# Patient Record
Sex: MALE | Race: BLACK OR AFRICAN AMERICAN | Employment: FULL TIME | URBAN - METROPOLITAN AREA
[De-identification: names, ages, dates, MRNs, and addresses within clinical notes are randomized per-mention and may not be internally consistent; named-entity substitution may affect disease eponyms.]

---

## 2017-01-05 ENCOUNTER — TELEPHONE (OUTPATIENT)
Dept: ORTHOPEDICS CLINIC | Facility: CLINIC | Age: 27
End: 2017-01-05

## 2017-01-05 NOTE — TELEPHONE ENCOUNTER
Pt. States that he continues to have pain in L ring finger and L knee area, so he wants to know if there is anything that he can take for the pain?

## 2017-01-05 NOTE — TELEPHONE ENCOUNTER
Ring pain sharp pain 7- 8 No change. Knee pain is the same also. Taking Advil or Ibuprofen. for pain. Feels like he is taking too much advill. . Has an appointment for Dr. Sayda Carballo on January 11th. .  MRI not until January 21.  Cancel appointment and luz

## 2017-01-06 RX ORDER — MELOXICAM 15 MG/1
15 TABLET ORAL DAILY
Qty: 30 TABLET | Refills: 0 | Status: SHIPPED | OUTPATIENT
Start: 2017-01-06 | End: 2017-02-05

## 2017-01-06 NOTE — TELEPHONE ENCOUNTER
S/w pt and informed him of VT message. Pharm confirmed. Cancelled pt appt on 1/11/17 and he will CB to RS.

## 2017-01-06 NOTE — TELEPHONE ENCOUNTER
Yes, wait until after the MRI to come in. Can take Meloxicam 15mg daily instead of Advil. #30, 0 refill. Also can take regular Tylenol as instructed over the counter.

## 2017-01-21 ENCOUNTER — HOSPITAL ENCOUNTER (OUTPATIENT)
Dept: MRI IMAGING | Facility: HOSPITAL | Age: 27
Discharge: HOME | End: 2017-01-21
Attending: ORTHOPAEDIC SURGERY
Payer: OTHER MISCELLANEOUS

## 2017-01-21 DIAGNOSIS — M54.50 LUMBAR SPINE PAIN: ICD-10-CM

## 2017-01-21 PROCEDURE — 73721 MRI JNT OF LWR EXTRE W/O DYE: CPT

## 2017-01-25 ENCOUNTER — TELEPHONE (OUTPATIENT)
Dept: ORTHOPEDICS CLINIC | Facility: CLINIC | Age: 27
End: 2017-01-25

## 2017-01-25 NOTE — TELEPHONE ENCOUNTER
S/w pt and informed him that he needs appt to discuss and he has appt on 2/8/17( soonest available for pt), but pt wants to know results.

## 2017-01-25 NOTE — TELEPHONE ENCOUNTER
You can let Smiley Roxanna know that the MRI results are more or less normal, come in to discuss in more detail and likely to get a cortisone injection.

## 2017-02-08 ENCOUNTER — OFFICE VISIT (OUTPATIENT)
Dept: ORTHOPEDICS CLINIC | Facility: CLINIC | Age: 27
End: 2017-02-08

## 2017-02-08 DIAGNOSIS — S63.654A SPRAIN OF METACARPOPHALANGEAL (MCP) JOINT OF RIGHT RING FINGER, INITIAL ENCOUNTER: ICD-10-CM

## 2017-02-08 DIAGNOSIS — S83.92XA SPRAIN OF LEFT KNEE, UNSPECIFIED LIGAMENT, INITIAL ENCOUNTER: Primary | ICD-10-CM

## 2017-02-08 PROCEDURE — 99214 OFFICE O/P EST MOD 30 MIN: CPT | Performed by: ORTHOPAEDIC SURGERY

## 2017-02-08 PROCEDURE — 99212 OFFICE O/P EST SF 10 MIN: CPT | Performed by: ORTHOPAEDIC SURGERY

## 2017-02-08 NOTE — PROGRESS NOTES
Patient presents today feeling a little better but still having pain in his left knee and right ring finger. I reviewed the MRI which essentially is normal.  I examined his knee and there are no obvious objective findings.   His right ring finger does have

## 2017-02-09 ENCOUNTER — TELEPHONE (OUTPATIENT)
Dept: DERMATOLOGY CLINIC | Facility: CLINIC | Age: 27
End: 2017-02-09

## 2017-02-09 DIAGNOSIS — Z51.81 MEDICATION MONITORING ENCOUNTER: ICD-10-CM

## 2017-02-09 DIAGNOSIS — L40.8 OTHER PSORIASIS: Primary | ICD-10-CM

## 2017-02-10 NOTE — TELEPHONE ENCOUNTER
Yes due for tb test--ok to order quantiferon gold blood test. Yes insurance changed 1st line. Might be able to do pa. If this is not an option Humira not outrageous to switch to.   No load just switch over to [employer]

## 2017-02-13 ENCOUNTER — TELEPHONE (OUTPATIENT)
Dept: ORTHOPEDICS CLINIC | Facility: CLINIC | Age: 27
End: 2017-02-13

## 2017-02-13 DIAGNOSIS — S63.655A SPRAIN OF METACARPOPHALANGEAL (MCP) JOINT OF LEFT RING FINGER, INITIAL ENCOUNTER: Primary | ICD-10-CM

## 2017-02-13 NOTE — TELEPHONE ENCOUNTER
All charting in office visit note right ring finger as well as OT order. Please advise on changing order to Left ring finger. Call to [facility]. Made aware Dr. Daniel Berg on vacation.

## 2017-02-13 NOTE — TELEPHONE ENCOUNTER
Kaylyn called from [facility]  called requesting a corrected order for occupational therapy. She states it should be left finger not right. Patient has an appt tomorrow. Please fax corrected order to [phone]. Thank you.

## 2017-02-14 NOTE — TELEPHONE ENCOUNTER
I did put the Humira dose in[facility] month rx written

## 2017-02-15 ENCOUNTER — TELEPHONE (OUTPATIENT)
Dept: ORTHOPEDICS CLINIC | Facility: CLINIC | Age: 27
End: 2017-02-15

## 2017-02-16 ENCOUNTER — TELEPHONE (OUTPATIENT)
Dept: DERMATOLOGY CLINIC | Facility: CLINIC | Age: 27
End: 2017-02-16

## 2017-02-17 ENCOUNTER — TELEPHONE (OUTPATIENT)
Dept: DERMATOLOGY CLINIC | Facility: CLINIC | Age: 27
End: 2017-02-17

## 2017-02-20 NOTE — TELEPHONE ENCOUNTER
KMT's pt. Pharmacy asking if he should be on a loading dose of Humira. Pt has a long hx of enbrel use, KMT switched to Humira due to price. I do not see any mention of loading dose, but could you advise on this please?

## 2017-02-24 ENCOUNTER — TELEPHONE (OUTPATIENT)
Dept: ORTHOPEDICS CLINIC | Facility: CLINIC | Age: 27
End: 2017-02-24

## 2017-02-24 DIAGNOSIS — S63.655A SPRAIN OF METACARPOPHALANGEAL (MCP) JOINT OF LEFT RING FINGER, INITIAL ENCOUNTER: Primary | ICD-10-CM

## 2017-02-24 NOTE — TELEPHONE ENCOUNTER
Dominic/[phone]   requesting order clarification for OT. Please call, thank you.  fax# [phone]

## 2017-02-24 NOTE — TELEPHONE ENCOUNTER
Sprain left ring finger  Can OT  do resistance? Can OT do strengthening? Three visits only?   Please advise Cristina Hunt

## 2017-02-27 NOTE — TELEPHONE ENCOUNTER
S/w Dominic @ [facility] and informed him of VT orders. He would like new OT order. Will fax to [phone]. Order faxed. Joon Johnson wants to know if he should have pt mor splint?

## 2017-03-15 ENCOUNTER — TELEPHONE (OUTPATIENT)
Dept: ORTHOPEDICS CLINIC | Facility: CLINIC | Age: 27
End: 2017-03-15

## 2017-03-15 NOTE — TELEPHONE ENCOUNTER
Pt. has questions about order that he received in the mail for physical therapy. Pt. Wants to confirm if he should get physical therapy.

## 2017-03-15 NOTE — TELEPHONE ENCOUNTER
Pt states he rc'd a ref from us for 8 more sessions of PT and wanted to know if VT added more PT.  I advised him that when we put in out order for him in 2/2017 it automatically gets submitted to the ref dept so they can obtain an auth from his HMO ins, but

## 2017-03-21 ENCOUNTER — OFFICE VISIT (OUTPATIENT)
Dept: FAMILY MEDICINE CLINIC | Facility: CLINIC | Age: 27
End: 2017-03-21

## 2017-03-21 VITALS
HEART RATE: 79 BPM | SYSTOLIC BLOOD PRESSURE: 116 MMHG | BODY MASS INDEX: 22.24 KG/M2 | WEIGHT: 164.19 LBS | DIASTOLIC BLOOD PRESSURE: 81 MMHG | HEIGHT: 72.01 IN | RESPIRATION RATE: 17 BRPM | TEMPERATURE: 98 F

## 2017-03-21 DIAGNOSIS — M12.562 ARTHROPATHY, TRAUMATIC, KNEE, LEFT: ICD-10-CM

## 2017-03-21 DIAGNOSIS — G89.29 CHRONIC PAIN OF LEFT KNEE: Primary | ICD-10-CM

## 2017-03-21 DIAGNOSIS — M25.562 CHRONIC PAIN OF LEFT KNEE: Primary | ICD-10-CM

## 2017-03-21 PROCEDURE — 99214 OFFICE O/P EST MOD 30 MIN: CPT | Performed by: FAMILY MEDICINE

## 2017-03-21 PROCEDURE — 99212 OFFICE O/P EST SF 10 MIN: CPT | Performed by: FAMILY MEDICINE

## 2017-03-21 RX ORDER — METHYLPREDNISOLONE 4 MG/1
TABLET ORAL
Qty: 1 KIT | Refills: 0 | Status: SHIPPED | OUTPATIENT
Start: 2017-03-21 | End: 2017-04-07 | Stop reason: ALTCHOICE

## 2017-03-21 NOTE — PATIENT INSTRUCTIONS
Needs ortho reassessment and possibly repeat plain film of left knee (maybe even MRI). Medrol prescribed.  Continue with PT.

## 2017-03-25 ENCOUNTER — HOSPITAL ENCOUNTER (OUTPATIENT)
Dept: GENERAL RADIOLOGY | Facility: HOSPITAL | Age: 27
Discharge: HOME | End: 2017-03-25
Attending: FAMILY MEDICINE
Payer: OTHER MISCELLANEOUS

## 2017-03-25 DIAGNOSIS — M12.562 ARTHROPATHY, TRAUMATIC, KNEE, LEFT: ICD-10-CM

## 2017-03-25 DIAGNOSIS — M25.562 CHRONIC PAIN OF LEFT KNEE: ICD-10-CM

## 2017-03-25 DIAGNOSIS — G89.29 CHRONIC PAIN OF LEFT KNEE: ICD-10-CM

## 2017-03-25 PROCEDURE — 73562 X-RAY EXAM OF KNEE 3: CPT

## 2017-03-28 ENCOUNTER — TELEPHONE (OUTPATIENT)
Dept: FAMILY MEDICINE CLINIC | Facility: CLINIC | Age: 27
End: 2017-03-28

## 2017-03-28 DIAGNOSIS — M25.562 LEFT KNEE PAIN, UNSPECIFIED CHRONICITY: Primary | ICD-10-CM

## 2017-03-28 NOTE — TELEPHONE ENCOUNTER
Ramakrishna is asking to have his x-ray results from Saturday   Please contact pt asap   Thank You !

## 2017-03-31 ENCOUNTER — TELEPHONE (OUTPATIENT)
Dept: ORTHOPEDICS CLINIC | Facility: CLINIC | Age: 27
End: 2017-03-31

## 2017-03-31 NOTE — TELEPHONE ENCOUNTER
Verified pt name and . Reviewed test results and recommendations with pt per doctor's instructions. Pt is concerned about continuing effusion of knee. Pt is asking why he is still having this problem. Please advise.

## 2017-04-03 NOTE — TELEPHONE ENCOUNTER
Dr. Chantell Friedman, pt states is willing to see orthopedic. Please review Orthopedic referral pended for your review. Thank you.      Spoke to pt, informed pt if still symptomatic Dr. Chantell Friedman can offer orthopedic referral. Pt states is willing to see ortho for further ventura

## 2017-04-03 NOTE — TELEPHONE ENCOUNTER
S/w pt and he states he had kneee XR from pcp on 3/25/17 and was looking for mychart results. I advised him since pcp ordered the XR he would have to release the results.    Pt wants to know what to do about knee because PT didn't help and he is still havin

## 2017-04-03 NOTE — TELEPHONE ENCOUNTER
Pt notified of VT message. Offered appt on 4/10/17 @ 10am. He states he will not be using WC for this OV and he will still keep his 4/17/17 appt for WC.

## 2017-04-03 NOTE — TELEPHONE ENCOUNTER
He would have to come in for evaluation as there is really nothing found wrong on the MRI or the xray

## 2017-04-07 ENCOUNTER — OFFICE VISIT (OUTPATIENT)
Dept: ORTHOPEDICS CLINIC | Facility: CLINIC | Age: 27
End: 2017-04-07

## 2017-04-07 DIAGNOSIS — M25.562 CHRONIC PAIN OF LEFT KNEE: Primary | ICD-10-CM

## 2017-04-07 DIAGNOSIS — G89.29 CHRONIC PAIN OF LEFT KNEE: Primary | ICD-10-CM

## 2017-04-07 PROCEDURE — 99244 OFF/OP CNSLTJ NEW/EST MOD 40: CPT | Performed by: ORTHOPAEDIC SURGERY

## 2017-04-07 PROCEDURE — G0463 HOSPITAL OUTPT CLINIC VISIT: HCPCS | Performed by: ORTHOPAEDIC SURGERY
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
## 2017-04-08 ENCOUNTER — TELEPHONE (OUTPATIENT)
Dept: FAMILY MEDICINE CLINIC | Facility: CLINIC | Age: 27
End: 2017-04-08

## 2017-04-08 NOTE — TELEPHONE ENCOUNTER
Patient has FMLA forms that need to be completed. He is inquiring if Dr needs to see him in office or if can be done without an appt since Dr Asha Anderson is aware of circumstances required for FMLA.  Note that he has 15 days from today's date (4/8/2017) to hav

## 2017-04-10 NOTE — TELEPHONE ENCOUNTER
LMTCB pt can fax LA papers to [facility]PO office [phone].  SKY can also transfer to [facility] until 730pm today if pt has any question

## 2017-04-13 ENCOUNTER — TELEPHONE (OUTPATIENT)
Dept: ORTHOPEDICS CLINIC | Facility: CLINIC | Age: 27
End: 2017-04-13

## 2017-04-14 NOTE — TELEPHONE ENCOUNTER
Fax number provided to [facility] to send request in writing for the records, the request will be forwarded to [employer] for completion once recieved.  Also lm for pt and emailed him a blank release, request in writing is needed either from the pt or the doct

## 2017-04-14 NOTE — TELEPHONE ENCOUNTER
Pt called back and stated forms are for a different reason. Has not been seen for this since 2015. Pt informed to make an appointment for a follow up. Appointment made. No further action needed.

## 2017-04-17 ENCOUNTER — OFFICE VISIT (OUTPATIENT)
Dept: FAMILY MEDICINE CLINIC | Facility: CLINIC | Age: 27
End: 2017-04-17

## 2017-04-17 ENCOUNTER — TELEPHONE (OUTPATIENT)
Dept: FAMILY MEDICINE CLINIC | Facility: CLINIC | Age: 27
End: 2017-04-17

## 2017-04-17 VITALS
RESPIRATION RATE: 16 BRPM | WEIGHT: 164 LBS | HEIGHT: 72 IN | DIASTOLIC BLOOD PRESSURE: 84 MMHG | HEART RATE: 82 BPM | SYSTOLIC BLOOD PRESSURE: 134 MMHG | TEMPERATURE: 98 F | BODY MASS INDEX: 22.21 KG/M2

## 2017-04-17 DIAGNOSIS — R53.82 CHRONIC FATIGUE SYNDROME: Primary | ICD-10-CM

## 2017-04-17 PROCEDURE — 99212 OFFICE O/P EST SF 10 MIN: CPT | Performed by: FAMILY MEDICINE

## 2017-04-17 PROCEDURE — 99213 OFFICE O/P EST LOW 20 MIN: CPT | Performed by: FAMILY MEDICINE

## 2017-04-17 NOTE — PROGRESS NOTES
HPI:    Patient ID: Aye Munoz is a 32year old male. HPI Comments: 32year old AA male here for follow up on chronic fatigue syndrome. Bouts have been consistent occurring about 4 times a month with an average of 6 days off per week.  Rest definitel Multiple trigger points as depicted. Neurological: He is alert and oriented to person, place, and time. No cranial nerve deficit, sensory deficit or motor deficit. ASSESSMENT/PLAN:   1. Chronic fatigue syndrome  FMLA needs update.  Recommend

## 2017-04-17 NOTE — TELEPHONE ENCOUNTER
[facility] paperwork was fax, then in office forward to [address] [facility] - 4/17/17 - on the attached patient.

## 2017-04-26 ENCOUNTER — TELEPHONE (OUTPATIENT)
Dept: ADMINISTRATIVE | Age: 27
End: 2017-04-26

## 2017-04-26 NOTE — TELEPHONE ENCOUNTER
Good morning Dr. Lemar Barthel and work accommodation forms pending in [address]. Patient is requesting intermittent time off, 1-6 days per month for his fatigue, do you approve?  Patient is also requesting a work accommodation of a set schedule, 8-10 hours per

## 2017-05-09 ENCOUNTER — TELEPHONE (OUTPATIENT)
Dept: ORTHOPEDICS CLINIC | Facility: CLINIC | Age: 27
End: 2017-05-09

## 2017-05-09 NOTE — TELEPHONE ENCOUNTER
Pt referred to  [facility] Dr. Subhash Schroeder. Appointment with Dr. Subhash Schroeder on June 20th. Asking is you know any physician that he Might get in sooner?     (Called and  [facility] is June 21)    Please advise

## 2017-05-09 NOTE — TELEPHONE ENCOUNTER
Call back to Shira. Given information on [facility] for pain management. Given phone number. Explained that he will need to cancel Dr. Francia Whitaker and get referral from primary for the  [facility].  Verbalizes understanding

## 2017-05-09 NOTE — TELEPHONE ENCOUNTER
Pt stts that the earliest he could get in to see Dr Kwasi Escobedo was June 20th - wants to know does [facility] have a different doctor he can see sooner - please advise - thank you.

## 2017-05-13 ENCOUNTER — OFFICE VISIT (OUTPATIENT)
Dept: FAMILY MEDICINE CLINIC | Facility: CLINIC | Age: 27
End: 2017-05-13

## 2017-05-13 VITALS
HEIGHT: 72 IN | HEART RATE: 80 BPM | BODY MASS INDEX: 22.21 KG/M2 | SYSTOLIC BLOOD PRESSURE: 130 MMHG | WEIGHT: 164 LBS | DIASTOLIC BLOOD PRESSURE: 80 MMHG | RESPIRATION RATE: 16 BRPM | TEMPERATURE: 98 F

## 2017-05-13 DIAGNOSIS — R53.82 CHRONIC FATIGUE SYNDROME: Primary | ICD-10-CM

## 2017-05-13 PROCEDURE — 99212 OFFICE O/P EST SF 10 MIN: CPT | Performed by: FAMILY MEDICINE

## 2017-05-13 PROCEDURE — 99213 OFFICE O/P EST LOW 20 MIN: CPT | Performed by: FAMILY MEDICINE

## 2017-05-13 NOTE — PROGRESS NOTES
HPI:    Patient ID: Dio Tellez is a 32year old male. Malaise  This is a chronic problem. The current episode started more than 1 year ago. The problem occurs intermittently. The problem has been waxing and waning.  Associated symptoms include arthra

## 2017-05-19 ENCOUNTER — HOSPITAL ENCOUNTER (OUTPATIENT)
Dept: GENERAL RADIOLOGY | Facility: HOSPITAL | Age: 27
Discharge: HOME | End: 2017-05-19
Attending: ORTHOPAEDIC SURGERY
Payer: COMMERCIAL

## 2017-05-19 ENCOUNTER — OFFICE VISIT (OUTPATIENT)
Dept: ORTHOPEDICS CLINIC | Facility: CLINIC | Age: 27
End: 2017-05-19

## 2017-05-19 DIAGNOSIS — R52 PAIN: ICD-10-CM

## 2017-05-19 DIAGNOSIS — M22.41 PATELLA, CHONDROMALACIA, RIGHT: Primary | ICD-10-CM

## 2017-05-19 PROCEDURE — 99212 OFFICE O/P EST SF 10 MIN: CPT | Performed by: ORTHOPAEDIC SURGERY

## 2017-05-19 PROCEDURE — 73560 X-RAY EXAM OF KNEE 1 OR 2: CPT | Performed by: ORTHOPAEDIC SURGERY

## 2017-05-19 PROCEDURE — 73565 X-RAY EXAM OF KNEES: CPT | Performed by: ORTHOPAEDIC SURGERY

## 2017-05-19 PROCEDURE — 99243 OFF/OP CNSLTJ NEW/EST LOW 30: CPT | Performed by: ORTHOPAEDIC SURGERY

## 2017-05-19 NOTE — PROGRESS NOTES
5/19/2017  Jayson Boycemorris  112/1990  32year old   male  Alona Cockayne, MD    HPI:   Patient presents with:  Knee Pain: Pt is her efor a follow up on left knee pain. Pain continues anbnd now he is having pain in the right knee.  Appointment for Dr. Germaine Avila patient is awake and oriented x 3 and overall well appearing. The patient has normal mood. The patient is non-tender and atraumatic with the exception of their right knee. The patient's skin is intact and compartments are soft.   The patient's lower ex

## 2017-05-22 ENCOUNTER — TELEPHONE (OUTPATIENT)
Dept: FAMILY MEDICINE CLINIC | Facility: CLINIC | Age: 27
End: 2017-05-22

## 2017-05-22 DIAGNOSIS — L40.9 PSORIASIS: Primary | ICD-10-CM

## 2017-05-23 ENCOUNTER — PATIENT MESSAGE (OUTPATIENT)
Dept: FAMILY MEDICINE CLINIC | Facility: CLINIC | Age: 27
End: 2017-05-23

## 2017-05-24 ENCOUNTER — PATIENT MESSAGE (OUTPATIENT)
Dept: FAMILY MEDICINE CLINIC | Facility: CLINIC | Age: 27
End: 2017-05-24

## 2017-05-24 DIAGNOSIS — M25.562 PAIN IN BOTH KNEES, UNSPECIFIED CHRONICITY: Primary | ICD-10-CM

## 2017-05-24 DIAGNOSIS — M25.561 PAIN IN BOTH KNEES, UNSPECIFIED CHRONICITY: Primary | ICD-10-CM

## 2017-05-27 NOTE — TELEPHONE ENCOUNTER
DR Luis Miguel Tipton: please advise on Rheum referral request.    [facility] referral already approved in system.

## 2017-05-27 NOTE — TELEPHONE ENCOUNTER
From: Vickie Bryant  To: Ghislaine Ngo DO  Sent: 5/24/2017 3:28 AM CDT  Subject: Other    Requesting a referral to see a Rheumatologist for joint knee pain in the left and now right leg.      As well as a referral to see Dr. Mariana Hernandez to talk about pso

## 2017-06-07 ENCOUNTER — LAB ENCOUNTER (OUTPATIENT)
Dept: LAB | Age: 27
End: 2017-06-07
Attending: DERMATOLOGY
Payer: COMMERCIAL

## 2017-06-07 ENCOUNTER — OFFICE VISIT (OUTPATIENT)
Dept: DERMATOLOGY CLINIC | Facility: CLINIC | Age: 27
End: 2017-06-07

## 2017-06-07 DIAGNOSIS — Z79.899 ENCOUNTER FOR MEDICATION MANAGEMENT: ICD-10-CM

## 2017-06-07 DIAGNOSIS — L40.50 PSORIATIC ARTHRITIS (HCC): ICD-10-CM

## 2017-06-07 DIAGNOSIS — L40.0 PSORIASIS VULGARIS: Primary | ICD-10-CM

## 2017-06-07 DIAGNOSIS — L40.0 PSORIASIS VULGARIS: ICD-10-CM

## 2017-06-07 PROCEDURE — 86038 ANTINUCLEAR ANTIBODIES: CPT

## 2017-06-07 PROCEDURE — 85025 COMPLETE CBC W/AUTO DIFF WBC: CPT

## 2017-06-07 PROCEDURE — 36415 COLL VENOUS BLD VENIPUNCTURE: CPT

## 2017-06-07 PROCEDURE — 86431 RHEUMATOID FACTOR QUANT: CPT

## 2017-06-07 PROCEDURE — 99212 OFFICE O/P EST SF 10 MIN: CPT | Performed by: DERMATOLOGY

## 2017-06-07 PROCEDURE — 80053 COMPREHEN METABOLIC PANEL: CPT

## 2017-06-07 PROCEDURE — 99213 OFFICE O/P EST LOW 20 MIN: CPT | Performed by: DERMATOLOGY

## 2017-06-07 PROCEDURE — 86039 ANTINUCLEAR ANTIBODIES (ANA): CPT

## 2017-06-07 PROCEDURE — 86480 TB TEST CELL IMMUN MEASURE: CPT

## 2017-06-07 PROCEDURE — 86141 C-REACTIVE PROTEIN HS: CPT

## 2017-06-08 ENCOUNTER — TELEPHONE (OUTPATIENT)
Dept: ORTHOPEDICS CLINIC | Facility: CLINIC | Age: 27
End: 2017-06-08

## 2017-06-08 NOTE — PROGRESS NOTES
Quick Note:    Results not released. Not all final at this time. So far .  Vernon Samaniego pending  ______

## 2017-06-26 NOTE — PROGRESS NOTES
Rosario Martin is a 32year old male. Patient presents with:  Psoriasis: Pt here for annual visit. Needs RF of Humira. Pt states weekly dosing offered better control, now twice a month. Pt has flare at scalp (itchy) and groin and buttocks.     Derm anesthetic No     Social History Narrative   None on file     Family History   Problem Relation Age of Onset   • Cancer Mother    • Hypertension Mother    • Asthma Mother    • Breast Cancer Mother    • Prostate Cancer Paternal Grandfather    • Asthma Cousi Certainly could have overlap. Given flaring try increased dose of Humira. IpledgeWorsening symptoms we will increase the Humira to weekly. Will treat with medications in  grid. Overall skincare, liberal use of emollients discussed.       TB test:  Adrian

## 2017-07-11 ENCOUNTER — TELEPHONE (OUTPATIENT)
Dept: FAMILY MEDICINE CLINIC | Facility: CLINIC | Age: 27
End: 2017-07-11

## 2017-07-11 NOTE — TELEPHONE ENCOUNTER
Per Pt he has an appt tomorrow @ 9:20 AM with  and has FMLA papers needs to filled out,  Pt needs to know if Dr needs to see him or if he just need to go and bring those papers to the [facility] department? Pls advise.

## 2017-07-19 ENCOUNTER — OFFICE VISIT (OUTPATIENT)
Dept: FAMILY MEDICINE CLINIC | Facility: CLINIC | Age: 27
End: 2017-07-19

## 2017-07-19 VITALS
SYSTOLIC BLOOD PRESSURE: 131 MMHG | BODY MASS INDEX: 23.24 KG/M2 | TEMPERATURE: 98 F | HEART RATE: 79 BPM | WEIGHT: 166 LBS | HEIGHT: 71 IN | DIASTOLIC BLOOD PRESSURE: 70 MMHG

## 2017-07-19 DIAGNOSIS — S89.92XD LEFT KNEE INJURY, SUBSEQUENT ENCOUNTER: Primary | ICD-10-CM

## 2017-07-19 DIAGNOSIS — S69.90XD FINGER INJURY, UNSPECIFIED LATERALITY, SUBSEQUENT ENCOUNTER: ICD-10-CM

## 2017-07-19 DIAGNOSIS — S39.92XD LOWER BACK INJURY, SUBSEQUENT ENCOUNTER: ICD-10-CM

## 2017-07-19 PROCEDURE — 99214 OFFICE O/P EST MOD 30 MIN: CPT | Performed by: FAMILY MEDICINE

## 2017-07-19 PROCEDURE — 99212 OFFICE O/P EST SF 10 MIN: CPT | Performed by: FAMILY MEDICINE
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
## 2017-07-25 ENCOUNTER — TELEPHONE (OUTPATIENT)
Dept: DERMATOLOGY CLINIC | Facility: CLINIC | Age: 27
End: 2017-07-25

## 2017-07-25 NOTE — TELEPHONE ENCOUNTER
Work comp requests are completed by our copy service [employer]. Please forward patients or Ins Co to them - ph. [phone].  NK

## 2017-07-25 NOTE — TELEPHONE ENCOUNTER
LOV 6/7/17. Pt c/o pink, flaking patch to bottom of right foot. Requesting a topical treatment. Pt on Humira (due for injection today).

## 2017-07-26 RX ORDER — CLOBETASOL PROPIONATE 0.5 MG/G
1 CREAM TOPICAL 2 TIMES DAILY
Qty: 45 G | Refills: 1 | Status: SHIPPED | OUTPATIENT
Start: 2017-07-26 | End: 2017-07-27

## 2017-07-27 ENCOUNTER — TELEPHONE (OUTPATIENT)
Dept: DERMATOLOGY CLINIC | Facility: CLINIC | Age: 27
End: 2017-07-27

## 2017-07-27 RX ORDER — CLOBETASOL PROPIONATE 0.5 MG/G
1 CREAM TOPICAL 2 TIMES DAILY
Qty: 45 G | Refills: 1 | Status: SHIPPED | OUTPATIENT
Start: 2017-07-27 | End: 2017-08-28 | Stop reason: ALTCHOICE

## 2017-07-27 NOTE — TELEPHONE ENCOUNTER
Clinical program request  Reference [id number] pls see paper work in nurses box [facility], action required and pls reply promptly

## 2017-07-27 NOTE — TELEPHONE ENCOUNTER
[facility] called the office and asked that Clobetasol Rx should be cancelled per Damaso Ordoñez, informed yes.

## 2017-08-28 ENCOUNTER — OFFICE VISIT (OUTPATIENT)
Dept: FAMILY MEDICINE CLINIC | Facility: CLINIC | Age: 27
End: 2017-08-28

## 2017-08-28 VITALS
WEIGHT: 166 LBS | HEIGHT: 71 IN | SYSTOLIC BLOOD PRESSURE: 116 MMHG | BODY MASS INDEX: 23.24 KG/M2 | HEART RATE: 78 BPM | DIASTOLIC BLOOD PRESSURE: 76 MMHG | TEMPERATURE: 98 F | RESPIRATION RATE: 16 BRPM

## 2017-08-28 DIAGNOSIS — M25.562 CHRONIC PAIN OF BOTH KNEES: Primary | ICD-10-CM

## 2017-08-28 DIAGNOSIS — G89.29 CHRONIC PAIN OF BOTH KNEES: Primary | ICD-10-CM

## 2017-08-28 DIAGNOSIS — L40.9 PSORIASIS: ICD-10-CM

## 2017-08-28 DIAGNOSIS — M25.561 CHRONIC PAIN OF BOTH KNEES: Primary | ICD-10-CM

## 2017-08-28 DIAGNOSIS — M12.562 TRAUMATIC ARTHROPATHY OF LEFT KNEE: ICD-10-CM

## 2017-08-28 PROCEDURE — 99212 OFFICE O/P EST SF 10 MIN: CPT | Performed by: FAMILY MEDICINE

## 2017-08-28 PROCEDURE — 99214 OFFICE O/P EST MOD 30 MIN: CPT | Performed by: FAMILY MEDICINE
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
## 2017-09-01 ENCOUNTER — OFFICE VISIT (OUTPATIENT)
Dept: FAMILY MEDICINE CLINIC | Facility: CLINIC | Age: 27
End: 2017-09-01

## 2017-09-01 ENCOUNTER — NURSE TRIAGE (OUTPATIENT)
Dept: OTHER | Age: 27
End: 2017-09-01

## 2017-09-01 VITALS
WEIGHT: 165 LBS | TEMPERATURE: 98 F | HEART RATE: 56 BPM | SYSTOLIC BLOOD PRESSURE: 119 MMHG | DIASTOLIC BLOOD PRESSURE: 76 MMHG | BODY MASS INDEX: 23 KG/M2

## 2017-09-01 DIAGNOSIS — L02.01 CUTANEOUS ABSCESS OF FACE: Primary | ICD-10-CM

## 2017-09-01 PROCEDURE — 99212 OFFICE O/P EST SF 10 MIN: CPT | Performed by: FAMILY MEDICINE

## 2017-09-01 PROCEDURE — 99213 OFFICE O/P EST LOW 20 MIN: CPT | Performed by: FAMILY MEDICINE

## 2017-09-01 RX ORDER — AMOXICILLIN AND CLAVULANATE POTASSIUM 875; 125 MG/1; MG/1
1 TABLET, FILM COATED ORAL 2 TIMES DAILY
Qty: 20 TABLET | Refills: 0 | Status: SHIPPED | OUTPATIENT
Start: 2017-09-01 | End: 2017-09-05 | Stop reason: ALTCHOICE

## 2017-09-01 NOTE — PROGRESS NOTES
HPI:    Patient ID: Yolanda Pompa is a 32year old male. HPI  Patient presents with:  Swelling: left eye swelling  Started this morning and noticed large acne lesion on forehead last night. Review of Systems   Constitutional: Negative.     Eyes: [place]stock Redo

## 2017-09-02 ENCOUNTER — HOSPITAL ENCOUNTER (OUTPATIENT)
Age: 27
Discharge: HOME | End: 2017-09-02
Attending: EMERGENCY MEDICINE
Payer: COMMERCIAL

## 2017-09-02 ENCOUNTER — TELEPHONE (OUTPATIENT)
Dept: OTHER | Age: 27
End: 2017-09-02

## 2017-09-02 VITALS
WEIGHT: 166 LBS | OXYGEN SATURATION: 100 % | TEMPERATURE: 98 F | DIASTOLIC BLOOD PRESSURE: 77 MMHG | SYSTOLIC BLOOD PRESSURE: 121 MMHG | RESPIRATION RATE: 16 BRPM | BODY MASS INDEX: 22.48 KG/M2 | HEART RATE: 75 BPM | HEIGHT: 72 IN

## 2017-09-02 DIAGNOSIS — L03.211 FACIAL CELLULITIS: ICD-10-CM

## 2017-09-02 DIAGNOSIS — L02.01 FACIAL ABSCESS: Primary | ICD-10-CM

## 2017-09-02 PROCEDURE — 99214 OFFICE O/P EST MOD 30 MIN: CPT

## 2017-09-02 PROCEDURE — 87205 SMEAR GRAM STAIN: CPT | Performed by: EMERGENCY MEDICINE

## 2017-09-02 PROCEDURE — 10060 I&D ABSCESS SIMPLE/SINGLE: CPT

## 2017-09-02 PROCEDURE — 99203 OFFICE O/P NEW LOW 30 MIN: CPT

## 2017-09-02 PROCEDURE — 87070 CULTURE OTHR SPECIMN AEROBIC: CPT | Performed by: EMERGENCY MEDICINE

## 2017-09-02 RX ORDER — DOXYCYCLINE HYCLATE 100 MG/1
100 CAPSULE ORAL 2 TIMES DAILY
Qty: 20 CAPSULE | Refills: 0 | Status: SHIPPED | OUTPATIENT
Start: 2017-09-02 | End: 2017-09-27

## 2017-09-02 NOTE — TELEPHONE ENCOUNTER
Pt stts saw Dr. Basil Hill for abscess on forehead and left eyelid swelling yesterday. Pt feels today  it is getting worse. No visual problem. I advised pt to go to urgent care to have it evaluated if insurance allows. Pt agreed.

## 2017-09-02 NOTE — ED INITIAL ASSESSMENT (HPI)
PATIENT ARRIVED AMBULATORY TO ROOM. POSSIBLE ABSCESS ABOVE THE LEFT EYEBROW.  PATIENT STATES \"I NOTICED IT 2 DAYS AGO\" PATIENT STATES \"I WENT TO THE DOCTOR YESTERDAY AND  HE TOLD ME IT WAS AN ABSCESS AND PUT ME ON ANTIBIOTICS\" PATIENT STATES \"THIS MORN

## 2017-09-02 NOTE — ED PROVIDER NOTES
Patient Seen in: [address]    History   Patient presents with:  Eye Problem    Stated Complaint: swelling around left eye    HPI    Patient's a 26-year-old male with a history of psoriasis who is currently on Humira note social      Review of Systems    Positive for stated complaint: swelling around left eye  Other systems are as noted in HPI. Constitutional and vital signs reviewed. All other systems reviewed and negative except as noted above.     PSFH elements revi 3 days  For Recheck      Medications Prescribed:  Current Discharge Medication List    START taking these medications    Doxycycline Hyclate 100 MG Oral Cap  Take 1 capsule (100 mg total) by mouth 2 (two) times daily.   Qty: 20 capsule Refills: 0

## 2017-09-03 ENCOUNTER — HOSPITAL ENCOUNTER (OUTPATIENT)
Age: 27
Discharge: HOME | End: 2017-09-03
Attending: EMERGENCY MEDICINE
Payer: COMMERCIAL

## 2017-09-03 VITALS
SYSTOLIC BLOOD PRESSURE: 117 MMHG | DIASTOLIC BLOOD PRESSURE: 74 MMHG | TEMPERATURE: 98 F | BODY MASS INDEX: 22.48 KG/M2 | HEART RATE: 79 BPM | HEIGHT: 72 IN | OXYGEN SATURATION: 97 % | RESPIRATION RATE: 16 BRPM | WEIGHT: 166 LBS

## 2017-09-03 DIAGNOSIS — L03.211 FACIAL CELLULITIS: Primary | ICD-10-CM

## 2017-09-03 DIAGNOSIS — L02.01 FACIAL ABSCESS: ICD-10-CM

## 2017-09-03 PROCEDURE — 99211 OFF/OP EST MAY X REQ PHY/QHP: CPT

## 2017-09-03 NOTE — ED INITIAL ASSESSMENT (HPI)
Pt was seen at [address] [place] [address] yesterday for swelling around  left eye and bump to face. Pt here per MD to follow up. Swelling appears to be better.  Pt denies any new complaints

## 2017-09-03 NOTE — ED PROVIDER NOTES
Patient Seen in: [facility]    History   Patient presents with:   Other    Stated Complaint: follow up w doc    HPI    The patient is a 27-year-old male with a history of psoriasis and is on Humira with the last injection Grandfather    • Asthma Cousin    • Asthma Paternal Uncle        Smoking status: Current Every Day Smoker                                                   Packs/day: 0.25      Years: 9.00         Types: Cigarettes  Smokeless tobacco: Never Used in 3 days  For Recheck      Medications Prescribed:  Current Discharge Medication List

## 2017-09-05 ENCOUNTER — OFFICE VISIT (OUTPATIENT)
Dept: FAMILY MEDICINE CLINIC | Facility: CLINIC | Age: 27
End: 2017-09-05

## 2017-09-05 VITALS
SYSTOLIC BLOOD PRESSURE: 114 MMHG | HEIGHT: 72 IN | WEIGHT: 163 LBS | BODY MASS INDEX: 22.08 KG/M2 | HEART RATE: 73 BPM | DIASTOLIC BLOOD PRESSURE: 76 MMHG

## 2017-09-05 DIAGNOSIS — L02.01 ABSCESS OF FACE: Primary | ICD-10-CM

## 2017-09-05 PROCEDURE — 99212 OFFICE O/P EST SF 10 MIN: CPT | Performed by: PHYSICIAN ASSISTANT

## 2017-09-05 NOTE — PROGRESS NOTES
HPI:    Patient ID: Mason Alonzo is a 32year old male. Patient presents for follow up from immediate care for abscess to left side forehead since 9/1/17. Patient was initially seen and prescribed Augmentin.   He then noted area became more swollen an placed in this encounter.       Meds This Visit:  No prescriptions requested or ordered in this encounter    Imaging & Referrals:  None       #[id number]

## 2017-09-27 ENCOUNTER — OFFICE VISIT (OUTPATIENT)
Dept: DERMATOLOGY CLINIC | Facility: CLINIC | Age: 27
End: 2017-09-27

## 2017-09-27 ENCOUNTER — TELEPHONE (OUTPATIENT)
Dept: DERMATOLOGY CLINIC | Facility: CLINIC | Age: 27
End: 2017-09-27

## 2017-09-27 DIAGNOSIS — L40.50 PSORIATIC ARTHRITIS (HCC): ICD-10-CM

## 2017-09-27 DIAGNOSIS — Z51.81 MEDICATION MONITORING ENCOUNTER: ICD-10-CM

## 2017-09-27 DIAGNOSIS — L40.9 PSORIASIS: ICD-10-CM

## 2017-09-27 DIAGNOSIS — L40.8 OTHER PSORIASIS: Primary | ICD-10-CM

## 2017-09-27 DIAGNOSIS — Z51.81 ENCOUNTER FOR MEDICATION MONITORING: ICD-10-CM

## 2017-09-27 PROCEDURE — 99214 OFFICE O/P EST MOD 30 MIN: CPT | Performed by: DERMATOLOGY

## 2017-09-27 PROCEDURE — 99212 OFFICE O/P EST SF 10 MIN: CPT | Performed by: DERMATOLOGY

## 2017-09-27 RX ORDER — CALCIPOTRIENE 50 UG/G
CREAM TOPICAL
Qty: 120 G | Refills: 12 | Status: SHIPPED | OUTPATIENT
Start: 2017-09-27 | End: 2017-09-27

## 2017-09-27 RX ORDER — CALCIPOTRIENE 50 UG/G
CREAM TOPICAL
Qty: 120 G | Refills: 12 | Status: SHIPPED | OUTPATIENT
Start: 2017-09-27 | End: 2018-02-19 | Stop reason: ALTCHOICE

## 2017-09-27 NOTE — TELEPHONE ENCOUNTER
Pt want want his regulary [facility]  Please resent med. n humira only med go to mail in [place]

## 2017-09-27 NOTE — TELEPHONE ENCOUNTER
Pt requested to have methotrexate and calcipotriene transferred to [address] [place] [address] cancelled at mail order and sent to retail instead

## 2017-09-30 ENCOUNTER — PATIENT MESSAGE (OUTPATIENT)
Dept: FAMILY MEDICINE CLINIC | Facility: CLINIC | Age: 27
End: 2017-09-30

## 2017-09-30 DIAGNOSIS — L40.50 PA (PSORIATIC ARTHRITIS) (HCC): Primary | ICD-10-CM

## 2017-09-30 NOTE — TELEPHONE ENCOUNTER
From: Robert Trent  To: Isra Marshall DO  Sent: 9/30/2017 5:56 AM CDT  Subject: Referral Request    Good morning,     doctor Laquita Paredes will like me to request referral to see a rheumatologist for PsA.     As well, are you able to provide me a doctor's

## 2017-09-30 NOTE — TELEPHONE ENCOUNTER
DR Cesar Jo: see patient nomi. Was seen by DR Wen Hicks 9/27/17; requesting referral to [facility].   Also see LA request.

## 2017-10-03 ENCOUNTER — TELEPHONE (OUTPATIENT)
Dept: DERMATOLOGY CLINIC | Facility: CLINIC | Age: 27
End: 2017-10-03

## 2017-10-03 DIAGNOSIS — Z51.81 ENCOUNTER FOR MEDICATION MONITORING: ICD-10-CM

## 2017-10-03 DIAGNOSIS — L40.0 PSORIASIS VULGARIS: Primary | ICD-10-CM

## 2017-10-03 RX ORDER — FOLIC ACID 1 MG/1
1 TABLET ORAL DAILY
Qty: 30 TABLET | Refills: 12 | Status: SHIPPED
Start: 2017-10-03 | End: 2018-02-19 | Stop reason: ALTCHOICE

## 2017-10-03 RX ORDER — FOLIC ACID 1 MG/1
1 TABLET ORAL DAILY
Qty: 30 TABLET | Refills: 12 | Status: SHIPPED | OUTPATIENT
Start: 2017-10-03 | End: 2017-10-03

## 2017-10-03 NOTE — TELEPHONE ENCOUNTER
Yes, discussed with patient at visit. New Rx sent. (Well aware of this since my best friend is the Paulina Parks of the article that prompted this therapeutic change) may need to use a reduced dose of folic acid if GI upset. Low-dose methotrexate anticipated.

## 2017-10-03 NOTE — TELEPHONE ENCOUNTER
Pt informed of message below. [facility] [place] [facility] request[facility]     Also, [facility] because pt states pharmacy is requesting order with \"weekly\" frequency. FYI.

## 2017-10-03 NOTE — TELEPHONE ENCOUNTER
Fax received from [facility] suggesting that pt should be on folic acid due to methotrexate use. Please advise on this.

## 2017-10-08 NOTE — PROGRESS NOTES
Alicia Henriquez is a 32year old male. Patient presents with:  Psoriasis: F/up - additional sites include chin, feet, hands. Humira once per week.   Knee Pain: Bilateral knee pain x 10 months; possible psoriatic arthritis, per patient            Season External Cream Use bid Disp: 120 g Rfl: 12     Allergies:     Seasonal                    Past Medical History:   Diagnosis Date   • Arthritis    • Chronic fatigue syndrome    • Metal bone fixation hardware in place 2008    plate in jaw   • Psoriasis    • con't :   Patient with chronic psoriasis. On systemic agents noted. No problems tolerating. No adverse events,  no problems with infection. Denies any significant joint swelling pain tenderness stiffness. No fevers chills night sweats.   No lymph node Possible retinoid although patient concerned regarding dryness, long-term side effects.   ASSESSMENT AND PLAN:     Other psoriasis  (primary encounter diagnosis)  Encounter for medication monitoring  Psoriasis  Medication monitoring encounter  Psoriatic art

## 2017-10-20 ENCOUNTER — LAB ENCOUNTER (OUTPATIENT)
Dept: LAB | Age: 27
End: 2017-10-20
Attending: DERMATOLOGY
Payer: COMMERCIAL

## 2017-10-20 DIAGNOSIS — L40.0 PSORIASIS VULGARIS: ICD-10-CM

## 2017-10-20 DIAGNOSIS — Z51.81 ENCOUNTER FOR MEDICATION MONITORING: ICD-10-CM

## 2017-10-20 PROCEDURE — 36415 COLL VENOUS BLD VENIPUNCTURE: CPT

## 2017-10-20 PROCEDURE — 85025 COMPLETE CBC W/AUTO DIFF WBC: CPT

## 2017-10-21 NOTE — PROGRESS NOTES
Pt notified of lab results and confirmed Oracio Parnell wants CBC repeated late next week. Pt voiced understanding & notes he is taking methotrexate Sunday, Monday & Tues. Discussed with Oracio Parnell and he was advised to take 3 tabs on 1 day per week. Pt agreed to this.

## 2017-11-08 ENCOUNTER — OFFICE VISIT (OUTPATIENT)
Dept: RHEUMATOLOGY | Facility: CLINIC | Age: 27
End: 2017-11-08

## 2017-11-08 VITALS
HEIGHT: 72 IN | HEART RATE: 70 BPM | DIASTOLIC BLOOD PRESSURE: 65 MMHG | BODY MASS INDEX: 21.65 KG/M2 | WEIGHT: 159.81 LBS | SYSTOLIC BLOOD PRESSURE: 114 MMHG

## 2017-11-08 DIAGNOSIS — L40.9 PSORIASIS: Primary | ICD-10-CM

## 2017-11-08 DIAGNOSIS — Z51.81 MEDICATION MONITORING ENCOUNTER: ICD-10-CM

## 2017-11-08 DIAGNOSIS — L40.50 PSORIATIC ARTHROPATHY (HCC): ICD-10-CM

## 2017-11-08 PROCEDURE — 99212 OFFICE O/P EST SF 10 MIN: CPT | Performed by: INTERNAL MEDICINE

## 2017-11-08 PROCEDURE — 99244 OFF/OP CNSLTJ NEW/EST MOD 40: CPT | Performed by: INTERNAL MEDICINE

## 2017-11-08 NOTE — PROGRESS NOTES
Dear Siva Anderson:    I saw your patient Olivier Del Real in consultation this afternoon at your request for evaluation of psoriatic arthritis.     As you know, he is a 19-year-old gentleman who developed severe psoriasis in 2008, mostly on his scalp, genitals, an  on his feet aggravates his knee pain,and can make his left knee swell. Past medical history:  He has chronic fatigue syndrome. He has chronic tension headaches. He fractured his left lower jaw and has a plate.   He stopped smoking normal.  There is no synovitis across his PIP joints. Capillary refill is good in his fingertips. Hips move well. There is a very small effusion in his left knee. Both knees flex and extend fully. No instability. No joint line tenderness.   Ankles mov

## 2017-11-17 ENCOUNTER — OFFICE VISIT (OUTPATIENT)
Dept: FAMILY MEDICINE CLINIC | Facility: CLINIC | Age: 27
End: 2017-11-17

## 2017-11-17 ENCOUNTER — LAB ENCOUNTER (OUTPATIENT)
Dept: LAB | Age: 27
End: 2017-11-17
Attending: INTERNAL MEDICINE
Payer: COMMERCIAL

## 2017-11-17 VITALS
HEIGHT: 72 IN | HEART RATE: 66 BPM | BODY MASS INDEX: 21.54 KG/M2 | TEMPERATURE: 97 F | RESPIRATION RATE: 17 BRPM | SYSTOLIC BLOOD PRESSURE: 136 MMHG | DIASTOLIC BLOOD PRESSURE: 85 MMHG | WEIGHT: 159 LBS

## 2017-11-17 DIAGNOSIS — L40.0 PSORIASIS VULGARIS: ICD-10-CM

## 2017-11-17 DIAGNOSIS — L40.9 PSORIASIS: Primary | ICD-10-CM

## 2017-11-17 DIAGNOSIS — B35.3 TINEA PEDIS OF BOTH FEET: ICD-10-CM

## 2017-11-17 DIAGNOSIS — Z23 NEED FOR PROPHYLACTIC VACCINATION AND INOCULATION AGAINST INFLUENZA: ICD-10-CM

## 2017-11-17 DIAGNOSIS — L40.50 PSORIATIC ARTHROPATHY (HCC): ICD-10-CM

## 2017-11-17 DIAGNOSIS — R79.89 ABNORMAL CBC: ICD-10-CM

## 2017-11-17 DIAGNOSIS — Z51.81 MEDICATION MONITORING ENCOUNTER: ICD-10-CM

## 2017-11-17 DIAGNOSIS — Z51.81 ENCOUNTER FOR MEDICATION MONITORING: ICD-10-CM

## 2017-11-17 PROCEDURE — 85652 RBC SED RATE AUTOMATED: CPT

## 2017-11-17 PROCEDURE — 99212 OFFICE O/P EST SF 10 MIN: CPT | Performed by: PHYSICIAN ASSISTANT

## 2017-11-17 PROCEDURE — 36415 COLL VENOUS BLD VENIPUNCTURE: CPT

## 2017-11-17 PROCEDURE — 90471 IMMUNIZATION ADMIN: CPT | Performed by: PHYSICIAN ASSISTANT

## 2017-11-17 PROCEDURE — 84450 TRANSFERASE (AST) (SGOT): CPT

## 2017-11-17 PROCEDURE — 82565 ASSAY OF CREATININE: CPT

## 2017-11-17 PROCEDURE — 99213 OFFICE O/P EST LOW 20 MIN: CPT | Performed by: PHYSICIAN ASSISTANT

## 2017-11-17 PROCEDURE — 90686 IIV4 VACC NO PRSV 0.5 ML IM: CPT | Performed by: PHYSICIAN ASSISTANT

## 2017-11-17 PROCEDURE — 86140 C-REACTIVE PROTEIN: CPT

## 2017-11-17 PROCEDURE — 85025 COMPLETE CBC W/AUTO DIFF WBC: CPT

## 2017-11-17 PROCEDURE — 82040 ASSAY OF SERUM ALBUMIN: CPT

## 2017-11-17 RX ORDER — TRIAMCINOLONE ACETONIDE 5 MG/G
CREAM TOPICAL
Qty: 30 G | Refills: 1 | Status: SHIPPED | OUTPATIENT
Start: 2017-11-17 | End: 2018-02-19 | Stop reason: ALTCHOICE

## 2017-11-17 RX ORDER — KETOCONAZOLE 20 MG/G
CREAM TOPICAL
Qty: 30 G | Refills: 1 | Status: SHIPPED | OUTPATIENT
Start: 2017-11-17 | End: 2018-01-03

## 2017-11-17 NOTE — PROGRESS NOTES
HPI:    Patient ID: Marge Pineda is a 32year old male. Patient presents for burning and itchy rash on both feet for past two months. He has history of psoriasis and psoriatic arthritis and is on Humira. He had psoriasis on scalp which has resolved. Bilateral medial aspect of feet erythema with peeling skin, one area of open skin without any signs of secondary infection. Psychiatric: He has a normal mood and affect. Vitals reviewed.              ASSESSMENT/PLAN:   Psoriasis  (primary encounter diag

## 2017-11-20 ENCOUNTER — TELEPHONE (OUTPATIENT)
Dept: OTHER | Age: 27
End: 2017-11-20

## 2017-11-20 NOTE — TELEPHONE ENCOUNTER
----- Message from Olivier Del Real sent at 11/19/2017  5:39 AM CST -----  Regarding: Other  Contact: [phone]  Good morning,    I provided my job with the note excusing me for the dates of 11/14 and 11/17.       But they are stating in order to be excuse

## 2017-12-05 ENCOUNTER — TELEPHONE (OUTPATIENT)
Dept: ADMINISTRATIVE | Age: 27
End: 2017-12-05

## 2017-12-05 NOTE — TELEPHONE ENCOUNTER
FMLA form received from pt via email for [place], [place]. PAU packet emailed to pt ([email or web address]). Logged for processing.  NK

## 2017-12-05 NOTE — TELEPHONE ENCOUNTER
Sanket Escalante pending in PAU. Pt has 1-6 days intermittent time off with Dr. Isidoro Gottron for CFS, but now pt is requesting 1-8 days intermittent time off starting from 11/1/17 - 3/30/18. Do you approve? Please advise.     Thank you,  Francisco Vargas

## 2017-12-06 NOTE — TELEPHONE ENCOUNTER
Completed. Signature stamp is on page 4 of document,  Please let me know how to move it to another page or re-do to move stamp to provider signature line if needed. Thanks.

## 2017-12-06 NOTE — TELEPHONE ENCOUNTER
Dr. Kunal Walker,    Please sign off on form:  -Highlight the patient and hit \"Chart\" button. -In Chart Review, w/in the Encounter tab - open the Telephone call encounter for 12/5/17. Scroll down.  -Click \"scan on\" blue Hyperlink under \"Media\" heading for MARTÍN Reed [birth year].  -Click on Acknowledge button at the bottom right corner and left-click onto image, signature stamp appears and drag signature to Provider signature line. Stamp will turn blue. Close window.     Thank you,  [address]

## 2017-12-29 ENCOUNTER — TELEPHONE (OUTPATIENT)
Dept: DERMATOLOGY CLINIC | Facility: CLINIC | Age: 27
End: 2017-12-29

## 2017-12-29 NOTE — TELEPHONE ENCOUNTER
[facility] contacted. States that they need a 90 day supply prescription to send rx to patient. Ok to change to 90 day?

## 2018-01-03 ENCOUNTER — OFFICE VISIT (OUTPATIENT)
Dept: DERMATOLOGY CLINIC | Facility: CLINIC | Age: 28
End: 2018-01-03

## 2018-01-03 DIAGNOSIS — L40.50 PSORIATIC ARTHRITIS (HCC): ICD-10-CM

## 2018-01-03 DIAGNOSIS — L40.8 OTHER PSORIASIS: ICD-10-CM

## 2018-01-03 DIAGNOSIS — Z51.81 ENCOUNTER FOR MEDICATION MONITORING: ICD-10-CM

## 2018-01-03 DIAGNOSIS — L40.0 PSORIASIS VULGARIS: Primary | ICD-10-CM

## 2018-01-03 DIAGNOSIS — Z51.81 MEDICATION MONITORING ENCOUNTER: ICD-10-CM

## 2018-01-03 PROCEDURE — 99212 OFFICE O/P EST SF 10 MIN: CPT | Performed by: DERMATOLOGY

## 2018-01-03 PROCEDURE — 99214 OFFICE O/P EST MOD 30 MIN: CPT | Performed by: DERMATOLOGY

## 2018-01-03 PROCEDURE — 96372 THER/PROPH/DIAG INJ SC/IM: CPT | Performed by: DERMATOLOGY

## 2018-01-03 RX ORDER — TRIAMCINOLONE ACETONIDE 40 MG/ML
40 INJECTION, SUSPENSION INTRA-ARTICULAR; INTRAMUSCULAR ONCE
Status: COMPLETED | OUTPATIENT
Start: 2018-01-03 | End: 2018-01-03

## 2018-01-03 RX ORDER — KETOCONAZOLE 20 MG/G
CREAM TOPICAL
Qty: 30 G | Refills: 1 | Status: SHIPPED | OUTPATIENT
Start: 2018-01-03 | End: 2018-02-19 | Stop reason: ALTCHOICE

## 2018-01-03 RX ORDER — TRIAMCINOLONE ACETONIDE 40 MG/ML
40 INJECTION, SUSPENSION INTRA-ARTICULAR; INTRAMUSCULAR ONCE
Status: CANCELLED | OUTPATIENT
Start: 2018-01-03 | End: 2018-01-03

## 2018-01-03 RX ADMIN — TRIAMCINOLONE ACETONIDE 40 MG: 40 INJECTION, SUSPENSION INTRA-ARTICULAR; INTRAMUSCULAR at 19:22:00

## 2018-01-04 ENCOUNTER — TELEPHONE (OUTPATIENT)
Dept: DERMATOLOGY CLINIC | Facility: CLINIC | Age: 28
End: 2018-01-04

## 2018-01-06 NOTE — TELEPHONE ENCOUNTER
Prior auth needed for Enbrel. [facility] sent over partially completed paperwork to complete (more information needed after telephone PA completed). Would you like us to proceed with PA for Enbrel?

## 2018-01-06 NOTE — TELEPHONE ENCOUNTER
enbrel--rx sent to [facility] for new mini's--not controlled on humira + MTX wants to re-try enbrel--if not covered options cosentyx vs tremfya

## 2018-01-08 NOTE — TELEPHONE ENCOUNTER
[facility] contacted, meds triamcinolone, mupirocin and ketoconazole are covered, PA not needed. Enbrel PA initiated. Form is being faxed to office. Awaiting form.        Also [facility] and informed per TONY Zarate not needed for ketoconazole

## 2018-01-08 NOTE — TELEPHONE ENCOUNTER
Per [facility] need the following  Clarification.  Dosage form pen,prefilled syringes or vial. Fax on counter

## 2018-01-09 ENCOUNTER — TELEPHONE (OUTPATIENT)
Dept: DERMATOLOGY CLINIC | Facility: CLINIC | Age: 28
End: 2018-01-09

## 2018-01-09 NOTE — TELEPHONE ENCOUNTER
[facility] contacted at [phone]. Rep states that insurance that pharmacy has is the incorrect insurance. Notes that insurance we and pharmacy have on file is incorrect and pt's ID number is [id number] and should be run under that Quiz[employer] ID.

## 2018-01-09 NOTE — TELEPHONE ENCOUNTER
Pt called back and states that Dominic Marie [address] states that pharmacy has to call [employer] to verify correct insurance ID. Pharmacy states that they did verify and Rx's were run under correct insurance ID.

## 2018-01-09 NOTE — TELEPHONE ENCOUNTER
LMTCB on pt's voice mail to confirm correct insurance and that we have correct insurance on file, especially because we need correct information for billing and to complete Enbrel PA.      [facility], states that pt does have 3 insurances on file, one wi

## 2018-01-09 NOTE — TELEPHONE ENCOUNTER
Pt is calling [facility] to clarify ID numbers, see if there are any updates due to the 1st of the year. Pt notes he will call back with insurance update information.

## 2018-01-09 NOTE — TELEPHONE ENCOUNTER
[facility] sent PA form for Enbrel with ID number [id number] for insurance ID (changed after the 1st of the year).       Andrew asks for clarification for Enbrel Rx ask if Rx is to be dispensed as pen, prefilled syringe, or vial.      PA paperwork chris

## 2018-01-09 NOTE — TELEPHONE ENCOUNTER
[facility] pharmacy states that topical meds did not go through insurance, pharmacist states they called [facility] and states that they were told med needs a PA. Attempted to call [place], [place] department closed for the day, will attempt to call tomorrow.

## 2018-01-12 NOTE — TELEPHONE ENCOUNTER
There is no loading dose for Enbrel ( never has been). Initial dose is 2x per week as rx states.  Thanks K

## 2018-01-12 NOTE — TELEPHONE ENCOUNTER
Completed PA form faxed to [facility]. Fax confirmation received. Awaiting response. [facility] called to clarify dosage form. [facility] called at [phone].     Pharmacist informed that Oracio Parnell would like the 50 mg/ML cartridge for the auto- i

## 2018-01-12 NOTE — TELEPHONE ENCOUNTER
We have an unopened autoinjector in our office. Placed in Enbrel information box with Peg Mayes label. [employer] contacted and states that they will contact our Enbrel  so they will provide us with more autoinjectors to give patients.

## 2018-01-13 NOTE — TELEPHONE ENCOUNTER
See message below. Tried to call number below x 2 and this is fax number. Called [phone] [facility] where script was sent 1-8-18 for Enbrel. Office closed today, open M-F, 7-5 p.m. Pls try again.

## 2018-01-13 NOTE — TELEPHONE ENCOUNTER
Please contact [employer] [facility]  at [phone]. Dose of Enbrel is 50mg 2x/wk--there is not other loading dose.

## 2018-01-13 NOTE — TELEPHONE ENCOUNTER
Fax re PA completed and sent back to [facility]. Form placed in  PA binder. NOTE:  Pls see message below from [facility] re calling [facility] to confirm Enbrel script. Not open at this time; pls try again during week.

## 2018-01-14 NOTE — PROGRESS NOTES
Rosario Martin is a 32year old male. Patient presents with:  Psoriasis: established pt. presents for psoriasis flare up - areas affected: scalp, feet, legs, hands.  Pt on humira, methotrexate, topical steroids - states they are not controlling his pso 9.00         Types: Cigarettes  Smokeless tobacco: Former User                     Alcohol use:  No               Comment: social                  Current Outpatient Prescriptions:  mupirocin 2 % External Ointment Apply 1 Application topically 3 (three) karen Former User    Alcohol use No    Comment: social    Drug use: No    Sexual activity: Not on file     Other Topics Concern    Grew up on a farm No    History of tanning No    Outdoor occupation No    Pt has a pacemaker No    Pt has a defibrillator Raina Foster psoriatic arthritis, per patient    HPI   Continued  Patient with chronic psoriasis. On systemic agents noted. No problems tolerating. No adverse events,  no problems with infection. Denies any significant joint swelling pain tenderness stiffness.   No methotrexate 3 tabs weekly]  Minimal improvement previously with Stelara progressive worsening currently with Humira. Even weekly. Twice weekly Enbrel had worked the best await response. Possible prednisone taper. I m Kenalog today.   May need to repeat encounter. 10/8/2017  [place] [facility]      The patient indicates understanding of these issues and agrees to the plan. The patient is asked to return as noted in follow-up as above.

## 2018-01-15 NOTE — TELEPHONE ENCOUNTER
Received fax from [address], [place] approved 01/14/2018 - 1/14/2020. PA and approval letter sent to scan.

## 2018-02-05 ENCOUNTER — TELEPHONE (OUTPATIENT)
Dept: DERMATOLOGY CLINIC | Facility: CLINIC | Age: 28
End: 2018-02-05

## 2018-02-05 NOTE — TELEPHONE ENCOUNTER
Pt would like to know if he can come in for an injection for his Psoarisis again.  Also can [address] fill out FMLA. pLease call

## 2018-02-05 NOTE — TELEPHONE ENCOUNTER
LOV 1/3/18, pt with psoriasis, on enbrel twice weekly (just started in Jan 2018), he does not use topicals \"they never work\".  States the best tx he's had was a kenalog injection at last visit - he is aware enbrel will take time to become effective, but w

## 2018-02-06 NOTE — TELEPHONE ENCOUNTER
No, the kenalog just buys time, this will thin the bones and does not prevent further damage to the joints, so I would still give the Enbrel some time. Ok to have 2nd injection--ok to schedule.   If not better next 3 mos then may need to consider newer bio

## 2018-02-12 ENCOUNTER — TELEPHONE (OUTPATIENT)
Dept: ADMINISTRATIVE | Age: 28
End: 2018-02-12

## 2018-02-12 NOTE — TELEPHONE ENCOUNTER
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 Logged for processing. PAU packet emailed to pt ([email or web address]).  NK

## 2018-02-19 ENCOUNTER — OFFICE VISIT (OUTPATIENT)
Dept: FAMILY MEDICINE CLINIC | Facility: CLINIC | Age: 28
End: 2018-02-19

## 2018-02-19 VITALS
DIASTOLIC BLOOD PRESSURE: 80 MMHG | TEMPERATURE: 98 F | SYSTOLIC BLOOD PRESSURE: 123 MMHG | WEIGHT: 150 LBS | HEART RATE: 93 BPM | BODY MASS INDEX: 20 KG/M2

## 2018-02-19 DIAGNOSIS — M25.562 ACUTE PAIN OF LEFT KNEE: Primary | ICD-10-CM

## 2018-02-19 PROCEDURE — 99213 OFFICE O/P EST LOW 20 MIN: CPT | Performed by: PHYSICIAN ASSISTANT

## 2018-02-19 PROCEDURE — 99212 OFFICE O/P EST SF 10 MIN: CPT | Performed by: PHYSICIAN ASSISTANT

## 2018-02-19 NOTE — PROGRESS NOTES
HPI:    Patient ID: Shaheen Infante is a 32year old male. Patient presents for pain in left knee since fall on ice 2 days ago. Patient was walking to his car after work and slipped, falling and landing on his right elbow and back.   Back pain and elbow Skin is warm and dry. Psychiatric: He has a normal mood and affect. Vitals reviewed. ASSESSMENT/PLAN:   Acute pain of left knee  (primary encounter diagnosis)  -Xray of left knee ordered. -RICE.   -Referral to ortho provided due to history

## 2018-02-28 NOTE — TELEPHONE ENCOUNTER
Form on HOLD in PAU,. Pt needs to have current appt and to pay and sign HIPAA. Orig form mld back to pt. Copies scanned. Appt sched with Dr. Edgard Walker 3-6-18.   YVETTE

## 2018-03-06 ENCOUNTER — OFFICE VISIT (OUTPATIENT)
Dept: FAMILY MEDICINE CLINIC | Facility: CLINIC | Age: 28
End: 2018-03-06

## 2018-03-06 VITALS
DIASTOLIC BLOOD PRESSURE: 84 MMHG | BODY MASS INDEX: 20.72 KG/M2 | TEMPERATURE: 98 F | HEART RATE: 76 BPM | RESPIRATION RATE: 16 BRPM | SYSTOLIC BLOOD PRESSURE: 121 MMHG | HEIGHT: 72 IN | WEIGHT: 153 LBS

## 2018-03-06 DIAGNOSIS — S39.012D STRAIN OF LUMBAR REGION, SUBSEQUENT ENCOUNTER: ICD-10-CM

## 2018-03-06 DIAGNOSIS — M25.521 RIGHT ELBOW PAIN: ICD-10-CM

## 2018-03-06 DIAGNOSIS — M25.562 ACUTE PAIN OF LEFT KNEE: ICD-10-CM
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
## 2018-03-06 PROCEDURE — 99214 OFFICE O/P EST MOD 30 MIN: CPT | Performed by: FAMILY MEDICINE

## 2018-03-06 PROCEDURE — 99212 OFFICE O/P EST SF 10 MIN: CPT | Performed by: FAMILY MEDICINE

## 2018-03-06 NOTE — PATIENT INSTRUCTIONS
Pain management via prescription medications as needed. To ortho for left knee evaluation to rule out internal knee derangement. May need PT. Status is continued total disability secondary to left knee and the risks looming regarding his occupation.

## 2018-03-06 NOTE — PROGRESS NOTES
HPI:    Patient ID: Pilo Rhodes is a 32year old male. 32year old AA male slipped on ice @ work 02/17/18. After twisting motion in left knee joint patient fell backwards onto lower back and right elbow.       Elbow Pain    The pain is present in the treatment provided moderate relief. Review of Systems   Gastrointestinal: Negative for bowel incontinence. Genitourinary: Negative for bladder incontinence.             Current Outpatient Prescriptions:  Etanercept (ENBREL MINI) 50 MG/ML Subcutaneou prescriptions requested or ordered in this encounter    Imaging & Referrals:  ORTHOPEDIC - EXTERNAL  Patient Instructions   Pain management via prescription medications as needed. To ortho for left knee evaluation to rule out internal knee derangement.   Radha Clark

## 2018-03-12 ENCOUNTER — TELEPHONE (OUTPATIENT)
Dept: DERMATOLOGY CLINIC | Facility: CLINIC | Age: 28
End: 2018-03-12

## 2018-03-13 NOTE — TELEPHONE ENCOUNTER
Pt has hx of psoriasis. Currently taking Enbrel and c/o flaring to bilateral feet x1 month. LOV 1/3/2018. Pt requesting appt for Friday when he is off of work for follow up. Appt scheduled.

## 2018-03-16 ENCOUNTER — OFFICE VISIT (OUTPATIENT)
Dept: DERMATOLOGY CLINIC | Facility: CLINIC | Age: 28
End: 2018-03-16

## 2018-03-16 DIAGNOSIS — L40.9 PSORIASIS: Primary | ICD-10-CM

## 2018-03-16 DIAGNOSIS — Z51.81 MEDICATION MONITORING ENCOUNTER: ICD-10-CM

## 2018-03-16 DIAGNOSIS — L40.0 PSORIASIS VULGARIS: ICD-10-CM

## 2018-03-16 DIAGNOSIS — L40.50 PSORIATIC ARTHRITIS (HCC): ICD-10-CM

## 2018-03-16 PROCEDURE — 99213 OFFICE O/P EST LOW 20 MIN: CPT | Performed by: DERMATOLOGY

## 2018-03-16 PROCEDURE — 99212 OFFICE O/P EST SF 10 MIN: CPT | Performed by: DERMATOLOGY

## 2018-03-16 PROCEDURE — 96372 THER/PROPH/DIAG INJ SC/IM: CPT | Performed by: DERMATOLOGY

## 2018-03-16 RX ORDER — TRIAMCINOLONE ACETONIDE 40 MG/ML
40 INJECTION, SUSPENSION INTRA-ARTICULAR; INTRAMUSCULAR ONCE
Status: COMPLETED | OUTPATIENT
Start: 2018-03-16 | End: 2018-03-16

## 2018-03-16 RX ADMIN — TRIAMCINOLONE ACETONIDE 40 MG: 40 INJECTION, SUSPENSION INTRA-ARTICULAR; INTRAMUSCULAR at 10:51:00

## 2018-03-26 NOTE — PROGRESS NOTES
Darrel Beach is a 32year old male.     Patient presents with:  Psoriasis: LOV 01/03/18 pt was seen for psoriasis here today for follow up continues with the ENBREL MINI 50 MG/ML Subcutaneous Solution Cartridge states that his feet and hands remain the Thyroid disease      Past Surgical History:  2008: Sheldon Modest PLATE      Comment: plate in jaw    Social History  Social History   Marital status: Single  Spouse name: N/A    Years of education: N/A  Number of children: N/A     Occupational His stills pain swelling feet hands ankles knees. Back pain. Psoriasis is worsening especially over feet has numerous fissured open areas cracking peeling painful.   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 Patient using Enbrel once weekly still having significant discomfort l for his palm soles and arthritis. Will stay on methotrexate for the time being.   Has not seemed monitoring encounter  Psoriatic arthritis (hcc)    No orders of the defined types were placed in this encounter. Results From Past 48 Hours:  No results found for this or any previous visit (from the past 48 hour(s)).     Meds This Visit:  Loraine Calderón

## 2018-04-02 ENCOUNTER — TELEPHONE (OUTPATIENT)
Dept: DERMATOLOGY CLINIC | Facility: CLINIC | Age: 28
End: 2018-04-02

## 2018-04-02 DIAGNOSIS — L40.9 PSORIASIS: Primary | ICD-10-CM

## 2018-04-02 DIAGNOSIS — Z51.81 MEDICATION MONITORING ENCOUNTER: ICD-10-CM

## 2018-04-02 NOTE — TELEPHONE ENCOUNTER
Pt requesting to have an order for TB test as well as any other labs [address] wants pt to have. States on Enbrel.  Please call

## 2018-04-03 ENCOUNTER — HOSPITAL ENCOUNTER (OUTPATIENT)
Dept: GENERAL RADIOLOGY | Facility: HOSPITAL | Age: 28
Discharge: HOME | End: 2018-04-03
Attending: ORTHOPAEDIC SURGERY

## 2018-04-03 ENCOUNTER — OFFICE VISIT (OUTPATIENT)
Dept: ORTHOPEDICS CLINIC | Facility: CLINIC | Age: 28
End: 2018-04-03

## 2018-04-03 DIAGNOSIS — M25.562 ACUTE PAIN OF LEFT KNEE: Primary | ICD-10-CM

## 2018-04-03 DIAGNOSIS — M25.562 LEFT KNEE PAIN, UNSPECIFIED CHRONICITY: ICD-10-CM

## 2018-04-03 PROCEDURE — 73560 X-RAY EXAM OF KNEE 1 OR 2: CPT | Performed by: ORTHOPAEDIC SURGERY

## 2018-04-03 PROCEDURE — 73565 X-RAY EXAM OF KNEES: CPT | Performed by: ORTHOPAEDIC SURGERY

## 2018-04-03 PROCEDURE — 99214 OFFICE O/P EST MOD 30 MIN: CPT | Performed by: ORTHOPAEDIC SURGERY

## 2018-04-03 PROCEDURE — 99212 OFFICE O/P EST SF 10 MIN: CPT | Performed by: ORTHOPAEDIC SURGERY

## 2018-04-03 NOTE — PROGRESS NOTES
4/3/2018  [place]  112/1990  32year old   male  Gurmeet Craven MD    HPI:   Patient presents with:   Injury: Left knee - onset 2/17/18 at work when he slipped and fell on ice - this is a work comp case and he states he provided the information Mother    • Asthma Mother    • Breast Cancer Mother    • Prostate Cancer Paternal Grandfather    • Asthma Cousin    • Asthma Paternal Uncle       Smoking status: Former Smoker                                                              Packs/day: 0.25 therapy and will follow-up in 4 weeks for repeat evaluation. Patient be out of work for the next 4 weeks. The patient continues have pain after the next visit, we will consider ordering further imaging which will include an MRI of the left knee.     All o

## 2018-04-11 ENCOUNTER — TELEPHONE (OUTPATIENT)
Dept: DERMATOLOGY CLINIC | Facility: CLINIC | Age: 28
End: 2018-04-11

## 2018-04-11 ENCOUNTER — PATIENT MESSAGE (OUTPATIENT)
Dept: FAMILY MEDICINE CLINIC | Facility: CLINIC | Age: 28
End: 2018-04-11

## 2018-04-11 DIAGNOSIS — K64.9 HEMORRHOIDS, UNSPECIFIED HEMORRHOID TYPE: Primary | ICD-10-CM

## 2018-04-11 NOTE — TELEPHONE ENCOUNTER
Pharmacist asking for a verbal order for injection device for enbrel mini - given verbal order with sig \"use as directed\".

## 2018-04-12 NOTE — TELEPHONE ENCOUNTER
Pt returned telephone call. Pt states that he received the Enbrel Mini's yesterday 4/11/2018. Pt states that he does not have the autoinjector.  Pt informed that an Enbrel Mini Autoinjector will be left for him to  at  of [facility] derm off

## 2018-04-14 NOTE — TELEPHONE ENCOUNTER
From: Celeste Collier  To: Tyrese Wong DO  Sent: 4/11/2018 6:53 PM CDT  Subject: Referral Request    Good evening Dr. Amairani Emerson,     May I please have a referral to a Gastroenterologist.       Thank you,     Chicho Givens.  Josephine Larry

## 2018-04-16 NOTE — TELEPHONE ENCOUNTER
Pt returned telephone call, states that the [employer] he has will not work with the actual Enbrel mini cartridge. Pt informed to bring back Auto Injector Leodan, Auto injector at  for pt. Pt aware.   He states he will present to derm this evenin

## 2018-04-16 NOTE — TELEPHONE ENCOUNTER
Patient's reasoning for GI referral request below:     From  Milla Madison To [employer] BriefMe  4/14/2018 12:43 PM   I would like to get my hemorrhoid remove.

## 2018-04-19 NOTE — TELEPHONE ENCOUNTER
gen called, states they will want defective Enbrel Mini Injector mailed back, of note, pt was not given a demo device, regular device with Lot number of [id number]. [employer] is mailing envelope to office to use to mail device back to [employer].   Device in cabinet

## 2018-04-24 ENCOUNTER — LAB ENCOUNTER (OUTPATIENT)
Dept: LAB | Age: 28
End: 2018-04-24
Attending: DERMATOLOGY
Payer: COMMERCIAL

## 2018-04-24 DIAGNOSIS — L40.50 PSORIATIC ARTHROPATHY (HCC): ICD-10-CM

## 2018-04-24 DIAGNOSIS — L40.9 PSORIASIS: ICD-10-CM

## 2018-04-24 DIAGNOSIS — Z51.81 MEDICATION MONITORING ENCOUNTER: ICD-10-CM

## 2018-04-24 PROCEDURE — 86140 C-REACTIVE PROTEIN: CPT

## 2018-04-24 PROCEDURE — 36415 COLL VENOUS BLD VENIPUNCTURE: CPT

## 2018-04-24 PROCEDURE — 80053 COMPREHEN METABOLIC PANEL: CPT

## 2018-04-24 PROCEDURE — 85652 RBC SED RATE AUTOMATED: CPT

## 2018-04-24 PROCEDURE — 85025 COMPLETE CBC W/AUTO DIFF WBC: CPT

## 2018-04-24 PROCEDURE — 86480 TB TEST CELL IMMUN MEASURE: CPT

## 2018-05-01 ENCOUNTER — OFFICE VISIT (OUTPATIENT)
Dept: SURGERY | Facility: CLINIC | Age: 28
End: 2018-05-01

## 2018-05-01 VITALS — WEIGHT: 155 LBS | HEIGHT: 71 IN | BODY MASS INDEX: 21.7 KG/M2

## 2018-05-01 DIAGNOSIS — L29.0 ANAL PRURITUS: ICD-10-CM

## 2018-05-01 DIAGNOSIS — L30.8 DERMATITIS ASSOCIATED WITH MOISTURE: Primary | ICD-10-CM

## 2018-05-01 PROCEDURE — 99212 OFFICE O/P EST SF 10 MIN: CPT | Performed by: SURGERY

## 2018-05-01 PROCEDURE — 99244 OFF/OP CNSLTJ NEW/EST MOD 40: CPT | Performed by: SURGERY

## 2018-05-01 NOTE — PROGRESS NOTES
History and Physical      Vickie Bryant is a 32year old male. HPI   Patient presents with:  Hemorrhoids: States he has hemorrhoids for years. Began bothering due to Psoriasis outbreak. Sees blood on the toilet paper, not in the toilet.   Believes i No  Outdoor occupation      No  Pt has a pacemaker      No  Pt has a defibrillator  No  Reaction to local anes* No        FAMILY HISTORY  Family History   Problem Relation Age of Onset   • Cancer Mother    • Hypertension Mother    • Asthma Mother    • Montezuma Masker from intermittently swollen hemorrhoid tissue. Discussed in detail with patient and options reviewed. Rec dermatology and rheumatology f/u. Topical care may include steroid cream and / or moisture barrier ointment, tucks pads. LGI w/u as needed.   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

## 2018-05-03 ENCOUNTER — OFFICE VISIT (OUTPATIENT)
Dept: DERMATOLOGY CLINIC | Facility: CLINIC | Age: 28
End: 2018-05-03

## 2018-05-03 DIAGNOSIS — Z51.81 MEDICATION MONITORING ENCOUNTER: ICD-10-CM

## 2018-05-03 DIAGNOSIS — L40.50 PSORIATIC ARTHRITIS (HCC): ICD-10-CM

## 2018-05-03 DIAGNOSIS — L40.0 PSORIASIS VULGARIS: Primary | ICD-10-CM

## 2018-05-03 PROCEDURE — 96372 THER/PROPH/DIAG INJ SC/IM: CPT | Performed by: DERMATOLOGY

## 2018-05-03 PROCEDURE — 99213 OFFICE O/P EST LOW 20 MIN: CPT | Performed by: DERMATOLOGY

## 2018-05-03 PROCEDURE — 99212 OFFICE O/P EST SF 10 MIN: CPT | Performed by: DERMATOLOGY

## 2018-05-03 RX ORDER — TRIAMCINOLONE ACETONIDE 40 MG/ML
40 INJECTION, SUSPENSION INTRA-ARTICULAR; INTRAMUSCULAR ONCE
Status: COMPLETED | OUTPATIENT
Start: 2018-05-03 | End: 2018-05-03

## 2018-05-03 RX ORDER — TRIAMCINOLONE ACETONIDE 40 MG/ML
40 INJECTION, SUSPENSION INTRA-ARTICULAR; INTRAMUSCULAR ONCE
Status: DISCONTINUED | OUTPATIENT
Start: 2018-05-03 | End: 2018-05-05

## 2018-05-03 RX ORDER — BETAMETHASONE DIPROPIONATE 0.5 MG/G
1 CREAM TOPICAL DAILY PRN
Qty: 180 G | Refills: 3 | Status: SHIPPED | OUTPATIENT
Start: 2018-05-03 | End: 2018-06-02

## 2018-05-03 RX ADMIN — TRIAMCINOLONE ACETONIDE 40 MG: 40 INJECTION, SUSPENSION INTRA-ARTICULAR; INTRAMUSCULAR at 12:01:00

## 2018-05-07 ENCOUNTER — NURSE TRIAGE (OUTPATIENT)
Dept: OTHER | Age: 28
End: 2018-05-07

## 2018-05-07 NOTE — TELEPHONE ENCOUNTER
Advised patient of Dr. Veto Gasca note. Patient verbalized understanding    Patient states he will try Advil for a couple of days.      If he is still having mild pain after a couple of days of taking Advil, he will schedule regular appointment with Dr. Dilan Zuniga

## 2018-05-07 NOTE — TELEPHONE ENCOUNTER
Likely musculoskeletal. Advil if he has no contraindication to it. Regular scheduled appointment. May need an adjustment. If symptoms worsen patient should proceed to UC or ER.

## 2018-05-07 NOTE — TELEPHONE ENCOUNTER
Action Requested: Summary for Provider     []  Critical Lab, Recommendations Needed  [] Need Additional Advice  []   FYI    []   Need Orders  [] Need Medications Sent to Pharmacy  []  Other     SUMMARY: Patient calling with left sided chest pain/pressure n

## 2018-05-10 NOTE — TELEPHONE ENCOUNTER
We have not received the envelop to send back Enbrel mini. Rep will look into this and make sure envelop gets sent out.  Ref #[id number]

## 2018-05-13 NOTE — PROGRESS NOTES
Darian Song is a 29year old male. Patient presents with:  Psoriasis: LOV: 3-6-18. Pt presenting today for f/u currently using Embrel and receiving kenalog injections. Still experiencing flare-ups to millicent feet, hands, and groin area.    Derm Problem: History:  2008: Landy MCALLISTER Right    Social History  Social History   Marital status: Single  Spouse name: N/A    Years of education: N/A  Number of children: 1     Occupational History         Social History Main Topics   Sm photosensitivity, lymph node swelling. No other skin complaints. History, medications, allergies as noted. On methotrexate as well. Has not noted any significant change or worsening with this. On anti-inflammatories.   He stills pain swelling feet hand Especially to fissures, palmar plantar areas until and change medications pros and cons of changing to cosentyx  versus cont enbrel. Patient using Enbrel once weekly before. Still having significant discomfort l for his palm soles and arthritis.   Will st needed. Other psoriasis  (primary encounter diagnosis)  Encounter for medication monitoring  Psoriasis  Medication monitoring encounter  Psoriatic arthritis (hcc)    No orders of the defined types were placed in this encounter.       Results From

## 2018-06-05 ENCOUNTER — OFFICE VISIT (OUTPATIENT)
Dept: ORTHOPEDICS CLINIC | Facility: CLINIC | Age: 28
End: 2018-06-05

## 2018-06-05 DIAGNOSIS — M22.42 CHONDROMALACIA OF LEFT PATELLA: Primary | ICD-10-CM

## 2018-06-05 PROCEDURE — 99213 OFFICE O/P EST LOW 20 MIN: CPT | Performed by: ORTHOPAEDIC SURGERY

## 2018-06-05 PROCEDURE — 99212 OFFICE O/P EST SF 10 MIN: CPT | Performed by: ORTHOPAEDIC SURGERY

## 2018-06-05 NOTE — PROGRESS NOTES
This is a pleasant 51-year-old male with left knee pain. The patient injured himself on February 17th, 2018. The patient was given a prescription for physical therapy but it was denied. He comes in today for repeat evaluation.   Patient reports he contin

## 2018-06-13 ENCOUNTER — TELEPHONE (OUTPATIENT)
Dept: PHYSICAL THERAPY | Age: 28
End: 2018-06-13

## 2018-06-13 ENCOUNTER — PATIENT MESSAGE (OUTPATIENT)
Dept: FAMILY MEDICINE CLINIC | Facility: CLINIC | Age: 28
End: 2018-06-13

## 2018-06-13 DIAGNOSIS — L40.9 PSORIASIS: Primary | ICD-10-CM

## 2018-06-14 NOTE — TELEPHONE ENCOUNTER
Dr. Quentin Martin:   Please see pended referral.   Patient previously had referral with Marva Gatica on 02/09/18 for psoriasis.

## 2018-06-14 NOTE — TELEPHONE ENCOUNTER
From: Robert Trent  To: Isra Marshall DO  Sent: 6/13/2018 6:20 PM CDT  Subject: Referral Request    May I have a referral to dermatologist QUINTON Paredes. Thanks.

## 2018-07-04 ENCOUNTER — PATIENT MESSAGE (OUTPATIENT)
Dept: FAMILY MEDICINE CLINIC | Facility: CLINIC | Age: 28
End: 2018-07-04

## 2018-07-05 ENCOUNTER — TELEPHONE (OUTPATIENT)
Dept: PHYSICAL THERAPY | Age: 28
End: 2018-07-05

## 2018-07-05 NOTE — TELEPHONE ENCOUNTER
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

## 2018-07-09 ENCOUNTER — OFFICE VISIT (OUTPATIENT)
Dept: FAMILY MEDICINE CLINIC | Facility: CLINIC | Age: 28
End: 2018-07-09

## 2018-07-09 ENCOUNTER — TELEPHONE (OUTPATIENT)
Dept: PHYSICAL THERAPY | Age: 28
End: 2018-07-09

## 2018-07-09 VITALS
HEIGHT: 71 IN | WEIGHT: 156 LBS | SYSTOLIC BLOOD PRESSURE: 121 MMHG | HEART RATE: 75 BPM | DIASTOLIC BLOOD PRESSURE: 75 MMHG | BODY MASS INDEX: 21.84 KG/M2

## 2018-07-09 DIAGNOSIS — G89.29 CHRONIC PAIN OF LEFT KNEE: Primary | ICD-10-CM

## 2018-07-09 DIAGNOSIS — M25.562 CHRONIC PAIN OF LEFT KNEE: Primary | ICD-10-CM

## 2018-07-09 PROCEDURE — 99213 OFFICE O/P EST LOW 20 MIN: CPT | Performed by: PHYSICIAN ASSISTANT

## 2018-07-09 PROCEDURE — 99212 OFFICE O/P EST SF 10 MIN: CPT | Performed by: PHYSICIAN ASSISTANT

## 2018-07-09 NOTE — PROGRESS NOTES
HPI:    Patient ID: Rosario Martin is a 29year old male. Patient presents for continuing left knee pain since injury that occurred at work 2/18/18. Patient had slipped on ice in parking lot and fell.   Patient was seen 2/19/18 in office and xrays done deficit. Skin: Skin is warm and dry. Psychiatric: He has a normal mood and affect. Vitals reviewed. ASSESSMENT/PLAN:   Chronic pain of left knee  (primary encounter diagnosis)  -Patient advised to start physical therapy.   New order entere

## 2018-07-10 ENCOUNTER — APPOINTMENT (OUTPATIENT)
Dept: PHYSICAL THERAPY | Age: 28
End: 2018-07-10
Attending: ORTHOPAEDIC SURGERY
Payer: OTHER MISCELLANEOUS

## 2018-07-10 ENCOUNTER — TELEPHONE (OUTPATIENT)
Dept: PHYSICAL THERAPY | Age: 28
End: 2018-07-10

## 2018-07-12 ENCOUNTER — TELEPHONE (OUTPATIENT)
Dept: PHYSICAL THERAPY | Age: 28
End: 2018-07-12

## 2018-07-12 ENCOUNTER — APPOINTMENT (OUTPATIENT)
Dept: PHYSICAL THERAPY | Age: 28
End: 2018-07-12
Attending: ORTHOPAEDIC SURGERY
Payer: OTHER MISCELLANEOUS

## 2018-07-17 ENCOUNTER — APPOINTMENT (OUTPATIENT)
Dept: PHYSICAL THERAPY | Age: 28
End: 2018-07-17
Attending: ORTHOPAEDIC SURGERY
Payer: OTHER MISCELLANEOUS

## 2018-07-17 ENCOUNTER — TELEPHONE (OUTPATIENT)
Dept: DERMATOLOGY CLINIC | Facility: CLINIC | Age: 28
End: 2018-07-17

## 2018-07-17 NOTE — TELEPHONE ENCOUNTER
See 4/11/18 telephone encounter. Anjana calling again. We did not receive envelop to return Embrel. Per [employer], they are sending new envelop. Dara Dunbar is above Dara Dunbar paperwork in nursing station. Please send this back.  There is a sticky note on defect

## 2018-07-19 ENCOUNTER — APPOINTMENT (OUTPATIENT)
Dept: PHYSICAL THERAPY | Age: 28
End: 2018-07-19
Attending: ORTHOPAEDIC SURGERY
Payer: OTHER MISCELLANEOUS

## 2018-07-24 ENCOUNTER — APPOINTMENT (OUTPATIENT)
Dept: PHYSICAL THERAPY | Age: 28
End: 2018-07-24
Attending: ORTHOPAEDIC SURGERY
Payer: OTHER MISCELLANEOUS

## 2018-07-26 ENCOUNTER — APPOINTMENT (OUTPATIENT)
Dept: PHYSICAL THERAPY | Age: 28
End: 2018-07-26
Attending: ORTHOPAEDIC SURGERY
Payer: OTHER MISCELLANEOUS

## 2018-07-31 ENCOUNTER — APPOINTMENT (OUTPATIENT)
Dept: PHYSICAL THERAPY | Age: 28
End: 2018-07-31
Attending: ORTHOPAEDIC SURGERY
Payer: OTHER MISCELLANEOUS

## 2018-08-02 RX ORDER — ETANERCEPT 50 MG/ML
1 SOLUTION SUBCUTANEOUS
Qty: 8 CARTRIDGE | Refills: 1 | Status: SHIPPED | OUTPATIENT
Start: 2018-08-02 | End: 2018-10-10 | Stop reason: ALTCHOICE

## 2018-08-06 ENCOUNTER — OFFICE VISIT (OUTPATIENT)
Dept: DERMATOLOGY CLINIC | Facility: CLINIC | Age: 28
End: 2018-08-06

## 2018-08-06 DIAGNOSIS — Z51.81 MEDICATION MONITORING ENCOUNTER: ICD-10-CM

## 2018-08-06 DIAGNOSIS — L40.8 OTHER PSORIASIS: Primary | ICD-10-CM

## 2018-08-06 DIAGNOSIS — L40.50 PSORIATIC ARTHRITIS (HCC): ICD-10-CM

## 2018-08-06 DIAGNOSIS — L40.0 PSORIASIS VULGARIS: ICD-10-CM

## 2018-08-06 DIAGNOSIS — L40.3 PLANTAR PUSTULAR PSORIASIS: ICD-10-CM

## 2018-08-06 PROCEDURE — 99212 OFFICE O/P EST SF 10 MIN: CPT | Performed by: DERMATOLOGY

## 2018-08-06 PROCEDURE — 96372 THER/PROPH/DIAG INJ SC/IM: CPT | Performed by: DERMATOLOGY

## 2018-08-06 PROCEDURE — 99213 OFFICE O/P EST LOW 20 MIN: CPT | Performed by: DERMATOLOGY

## 2018-08-06 RX ORDER — TRIAMCINOLONE ACETONIDE 40 MG/ML
40 INJECTION, SUSPENSION INTRA-ARTICULAR; INTRAMUSCULAR ONCE
Status: COMPLETED | OUTPATIENT
Start: 2018-08-06 | End: 2018-08-06

## 2018-08-06 RX ADMIN — TRIAMCINOLONE ACETONIDE 40 MG: 40 INJECTION, SUSPENSION INTRA-ARTICULAR; INTRAMUSCULAR at 18:40:00

## 2018-08-07 ENCOUNTER — TELEPHONE (OUTPATIENT)
Dept: FAMILY MEDICINE CLINIC | Facility: CLINIC | Age: 28
End: 2018-08-07

## 2018-08-08 ENCOUNTER — TELEPHONE (OUTPATIENT)
Dept: DERMATOLOGY CLINIC | Facility: CLINIC | Age: 28
End: 2018-08-08

## 2018-08-10 NOTE — TELEPHONE ENCOUNTER
Faxed [facility] insurance card to [phone]---cosentyx rep    Per rep they will do benefit investigation with this insurance.

## 2018-08-13 NOTE — TELEPHONE ENCOUNTER
PA in [facility] box waiting for signature all needed notes and TB results printed and attached to PA need to be faxed to [phone] once signed by JASON

## 2018-08-14 NOTE — TELEPHONE ENCOUNTER
Information requested from Gisela and placed in [address] box for review. Kindly advise on BSA percentage. Also awaiting LOV note from 8/6/2018. All information to be faxed to [facility].

## 2018-08-14 NOTE — TELEPHONE ENCOUNTER
PA for Cons[employer] denied. Reason: \"The first preferred product for the patient's health plan is Humira for the treatment of plaque psoriasis.  The other preferred products are Stelara and Rosiland Naegeli for patients who have tried Humira and for whom it didn't wor

## 2018-08-14 NOTE — TELEPHONE ENCOUNTER
Pa in pa folder. bsa-10-15% while on Enbrel--but this includes both plantar feet. 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

## 2018-08-16 NOTE — TELEPHONE ENCOUNTER
Angle. Reba Brown, is requesting initial PA papers to be submitted for Cos[employer]. Please fax 3 most recent clinic notes, TB labs (within 1 yr), BSA% and location, joints effected, tried and failed therapies. Carter fax [phone].

## 2018-08-19 NOTE — PROGRESS NOTES
Milla Madison is a 29year old male. Patient presents with:  Psoriasis: established pt, presents with psoriasis flare up to feet - requesting kenalog injection.  Pt on enbrel 1x week for 4 months - \"I don't think it's helping\"            Augmentin [ tanning No    Outdoor occupation No    Pt has a pacemaker No    Pt has a defibrillator No    Reaction to local anesthetic No     Social History Narrative   None on file     Family History   Problem Relation Age of Onset   • Cancer Mother    • Hypertension psoriasis. On systemic agents noted. No problems tolerating. No adverse events,  no problems with infection. Denies any significant joint swelling pain tenderness stiffness. No fevers chills night sweats.   No lymph node swelling, no chest pain, proble still flaring intermittently. Has seen rheumatology. Long-term risks of systemic agents, and need for monitoring, followup, routine  medical exam , lab tests discussed. Recheck in 2-3 months  Currently on methotrexate as well.   In past minimal improveme Visit:  Administrations This Visit     triamcinolone acetonide (KENALOG-40) 40 MG/ML injection 40 mg     Admin Date  08/06/2018 Action  Given Dose  40 mg Route  Intramuscular Administered By  Jl Garcia RN                Imaging Orders:  None     Ref

## 2018-08-21 NOTE — TELEPHONE ENCOUNTER
All requested paperwork faxed to bishop at this time. [facility]

## 2018-08-24 ENCOUNTER — TELEPHONE (OUTPATIENT)
Dept: ORTHOPEDICS CLINIC | Facility: CLINIC | Age: 28
End: 2018-08-24

## 2018-08-24 NOTE — TELEPHONE ENCOUNTER
, pt states he needs a return to work letter for a prior incident.  Pt states he started seeing you in April of 2017  for  chronic knee pain and per his Silvana Hutchins, they needed a letter stating that he was ok to Return to work on June 05, 2017

## 2018-08-24 NOTE — TELEPHONE ENCOUNTER
[facility]  [facility]. Last notes . [facility] fax .  Phone # [phone]

## 2018-08-28 ENCOUNTER — TELEPHONE (OUTPATIENT)
Dept: RHEUMATOLOGY | Facility: CLINIC | Age: 28
End: 2018-08-28

## 2018-08-28 ENCOUNTER — TELEPHONE (OUTPATIENT)
Dept: DERMATOLOGY CLINIC | Facility: CLINIC | Age: 28
End: 2018-08-28

## 2018-08-28 NOTE — TELEPHONE ENCOUNTER
Pt would like a call back with his blood work test results from last year. Pt would like to know if he was diagnosed with arthritis.

## 2018-08-28 NOTE — TELEPHONE ENCOUNTER
I didn't definitely diagnose psoriatic arthritis. He needs to schedule f/u appointment with me, so I can reassess.

## 2018-08-28 NOTE — TELEPHONE ENCOUNTER
Please see message below and advise. LOV 11/8/17 was a consult. No follow up in office. Assessment and plan:     1.  Long history of psoriasis, which appears well controlled. He may have psoriatic arthritis in his left knee. There is a small effusion.

## 2018-08-28 NOTE — TELEPHONE ENCOUNTER
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 Also, that his psoriasis is under control and we are monitoring patient.      Please ad

## 2018-08-28 NOTE — TELEPHONE ENCOUNTER
Pt states his job needs information regarding psoarisis. States  does not allow psoarisis. He needs letter to state Psoarisis does not effect his job at all and he can work.  Please call

## 2018-08-28 NOTE — TELEPHONE ENCOUNTER
[address] called -  [phone] - Going run around from patients insurance - States that insurance will not cover , unless its work related to his work comp claim.

## 2018-08-29 NOTE — TELEPHONE ENCOUNTER
Pt states that \"psoriatic arthritis\" is documented as a diagnosis in his medical record. 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  Pt stated he needs documentation stating

## 2018-08-30 ENCOUNTER — TELEPHONE (OUTPATIENT)
Dept: FAMILY MEDICINE CLINIC | Facility: CLINIC | Age: 28
End: 2018-08-30

## 2018-08-30 ENCOUNTER — TELEPHONE (OUTPATIENT)
Dept: DERMATOLOGY CLINIC | Facility: CLINIC | Age: 28
End: 2018-08-30

## 2018-08-30 DIAGNOSIS — M25.50 ARTHRALGIA, UNSPECIFIED JOINT: Primary | ICD-10-CM

## 2018-08-30 NOTE — TELEPHONE ENCOUNTER
LMT[employer] ext [phone]. Pt given follow up appt for tomorrow 2:30pm at [facility]. Asked pt to call back to confirm appt.

## 2018-08-30 NOTE — TELEPHONE ENCOUNTER
Patient states that his employer is asking for his last exam date in [facility] or appointment,  Susan  wants to know if DX-Porosis  is it less than 10% or less on his body. And is his dX mild or severe.

## 2018-08-30 NOTE — TELEPHONE ENCOUNTER
Faxed denial letter and insurance info to Dante, per [employer]. Patient will be enrolled in the \"covered until you're covered program\" if he has commercial insurance.

## 2018-08-31 ENCOUNTER — TELEPHONE (OUTPATIENT)
Dept: FAMILY MEDICINE CLINIC | Facility: CLINIC | Age: 28
End: 2018-08-31

## 2018-08-31 ENCOUNTER — OFFICE VISIT (OUTPATIENT)
Dept: RHEUMATOLOGY | Facility: CLINIC | Age: 28
End: 2018-08-31

## 2018-08-31 VITALS
DIASTOLIC BLOOD PRESSURE: 75 MMHG | WEIGHT: 156.63 LBS | BODY MASS INDEX: 21.93 KG/M2 | HEIGHT: 71 IN | HEART RATE: 105 BPM | SYSTOLIC BLOOD PRESSURE: 113 MMHG

## 2018-08-31 DIAGNOSIS — L40.9 PSORIASIS: Primary | ICD-10-CM

## 2018-08-31 DIAGNOSIS — M22.42 CHONDROMALACIA, PATELLA, LEFT: ICD-10-CM

## 2018-08-31 PROCEDURE — 99212 OFFICE O/P EST SF 10 MIN: CPT | Performed by: INTERNAL MEDICINE

## 2018-08-31 PROCEDURE — 99213 OFFICE O/P EST LOW 20 MIN: CPT | Performed by: INTERNAL MEDICINE

## 2018-08-31 NOTE — TELEPHONE ENCOUNTER
Bouchra Grajeda called requesting us to resend the denial letter from [employer]. Faxed. Confirmation received.

## 2018-08-31 NOTE — TELEPHONE ENCOUNTER
Pt is requesting a note for his employer stating that his condition of chronic fatigue doesn't stop him from doing his job. They are requesting pt's diagnosis and the reason he was diagnosed.  He states he was put to work 16 hours a day and had times wh

## 2018-09-01 NOTE — PROGRESS NOTES
Dear Margaret Garcia:    I saw your patient Milla Madison again in my rheumatology clinic. As you know, he is a 19-year-old gentleman who I had seen November 8th, 2017. He has a long history of psoriasis. He sees Dr. Ash Coley in dermatology.   He is now on Enbrel for the opportunity to participate in his care. Sincerely,      Bryan Haynes MD   Rheumatology.

## 2018-09-05 ENCOUNTER — TELEPHONE (OUTPATIENT)
Dept: DERMATOLOGY CLINIC | Facility: CLINIC | Age: 28
End: 2018-09-05

## 2018-09-07 ENCOUNTER — NURSE ONLY (OUTPATIENT)
Dept: DERMATOLOGY CLINIC | Facility: CLINIC | Age: 28
End: 2018-09-07

## 2018-09-07 DIAGNOSIS — L40.8 OTHER PSORIASIS: Primary | ICD-10-CM

## 2018-09-07 PROCEDURE — 96372 THER/PROPH/DIAG INJ SC/IM: CPT | Performed by: DERMATOLOGY

## 2018-09-10 NOTE — TELEPHONE ENCOUNTER
S/w Shelia Price as I am unsure which pharmacy called us - tel # lost in transit - called [facility], they do have pt on file and have everything up to date, appeal in progress, they do not need further action on our part - encounter closed.

## 2018-09-14 ENCOUNTER — OFFICE VISIT (OUTPATIENT)
Dept: ORTHOPEDICS CLINIC | Facility: CLINIC | Age: 28
End: 2018-09-14
Payer: OTHER MISCELLANEOUS

## 2018-09-14 DIAGNOSIS — M22.42 CHONDROMALACIA OF LEFT PATELLA: Primary | ICD-10-CM

## 2018-09-14 PROCEDURE — 99212 OFFICE O/P EST SF 10 MIN: CPT | Performed by: ORTHOPAEDIC SURGERY

## 2018-09-14 PROCEDURE — 99213 OFFICE O/P EST LOW 20 MIN: CPT | Performed by: ORTHOPAEDIC SURGERY

## 2018-09-14 NOTE — PROGRESS NOTES
This is a pleasant 30-year-old male with a previous diagnosis of left patellofemoral chondromalacia. He has performed physical therapy reports feeling better than previously. He is unable to run on the left knee. He comes in today for repeat evaluation.

## 2018-09-17 NOTE — TELEPHONE ENCOUNTER
S/w Natalya at [facility] as I do not see any request for a letter of medical necessity, [facility] is refaxing the forms.

## 2018-09-17 NOTE — TELEPHONE ENCOUNTER
Tommie Phalen states medical necesity letter faxed on 9/12 to be completed. Has this been received.  [phone]

## 2018-09-17 NOTE — TELEPHONE ENCOUNTER
[facility] and [facility] to [facility].

## 2018-09-24 NOTE — TELEPHONE ENCOUNTER
Appeal dept calling to verify diagnosis: psoriasis vulgaris, psoriatic arthritis, plantar pustular psoriasis - Appeal granted, cosentyx approved, they are sending an approval letter. Sent as an fyi, pt has been already receiving medication.

## 2018-09-27 ENCOUNTER — TELEPHONE (OUTPATIENT)
Dept: FAMILY MEDICINE CLINIC | Facility: CLINIC | Age: 28
End: 2018-09-27

## 2018-10-05 NOTE — TELEPHONE ENCOUNTER
Kevon Kim will send rf and get update, order pended. Please contact pharmacy # noted below. I did pend order, please confirm is correct pharmacy and get update from pt.

## 2018-10-05 NOTE — TELEPHONE ENCOUNTER
[facility] is requesting a refill for cosentex, pls advise and if any questions call rashmi at [place] [address]

## 2018-10-05 NOTE — TELEPHONE ENCOUNTER
LMTCB to get update on pt. Pt had first cosentyx injection 9/7/18. Proceed with refill of Cosentyx or would you like to wait for pt to call back with update? Please advise. Thank you.

## 2018-10-06 NOTE — TELEPHONE ENCOUNTER
Will still likely take 3 mos to see if the Cosentyx is working. Will send another rx for the 5th week of induction dosing. Yes, ok to come in for kenalog.

## 2018-10-06 NOTE — TELEPHONE ENCOUNTER
Spoke to pt. He did want script sent to [facility] so this was done. Pt states he missed his 5th week b/c med was left on porch. So this will be 5th dose (a week late) before maintenance starts.     Pt concerns for KMT:  Pt currently suman

## 2018-10-08 NOTE — TELEPHONE ENCOUNTER
All below KMT's recc provided to pt - appt made for kenalog, pt aware he needs a referral and will call PCP today

## 2018-10-09 ENCOUNTER — TELEPHONE (OUTPATIENT)
Dept: ORTHOPEDICS CLINIC | Facility: CLINIC | Age: 28
End: 2018-10-09

## 2018-10-09 ENCOUNTER — OFFICE VISIT (OUTPATIENT)
Dept: ORTHOPEDICS CLINIC | Facility: CLINIC | Age: 28
End: 2018-10-09
Payer: OTHER MISCELLANEOUS

## 2018-10-09 DIAGNOSIS — M22.42 CHONDROMALACIA OF LEFT PATELLA: Primary | ICD-10-CM

## 2018-10-09 PROCEDURE — 99213 OFFICE O/P EST LOW 20 MIN: CPT | Performed by: ORTHOPAEDIC SURGERY

## 2018-10-09 PROCEDURE — 99212 OFFICE O/P EST SF 10 MIN: CPT | Performed by: ORTHOPAEDIC SURGERY

## 2018-10-09 NOTE — PROGRESS NOTES
This is a pleasant 20-year-old male with previous diagnosis of left patellofemoral chondromalacia. Patient reports feeling much better than he did previously. He did complete physical therapy but has not completed work conditioning yet.   He comes in [facility]

## 2018-10-10 ENCOUNTER — OFFICE VISIT (OUTPATIENT)
Dept: DERMATOLOGY CLINIC | Facility: CLINIC | Age: 28
End: 2018-10-10

## 2018-10-10 ENCOUNTER — APPOINTMENT (OUTPATIENT)
Dept: LAB | Age: 28
End: 2018-10-10
Attending: DERMATOLOGY
Payer: COMMERCIAL

## 2018-10-10 VITALS — WEIGHT: 155 LBS | HEIGHT: 72 IN | BODY MASS INDEX: 20.99 KG/M2

## 2018-10-10 DIAGNOSIS — L40.9 PSORIASIS: ICD-10-CM

## 2018-10-10 DIAGNOSIS — L40.3 PLANTAR PUSTULAR PSORIASIS: ICD-10-CM

## 2018-10-10 DIAGNOSIS — L40.8 OTHER PSORIASIS: Primary | ICD-10-CM

## 2018-10-10 DIAGNOSIS — Z71.89 HISTORY OF PARTICIPATION IN SMOKING CESSATION COUNSELING: ICD-10-CM

## 2018-10-10 DIAGNOSIS — Z51.81 MEDICATION MONITORING ENCOUNTER: ICD-10-CM

## 2018-10-10 PROCEDURE — 99213 OFFICE O/P EST LOW 20 MIN: CPT | Performed by: DERMATOLOGY

## 2018-10-10 PROCEDURE — 96372 THER/PROPH/DIAG INJ SC/IM: CPT | Performed by: DERMATOLOGY

## 2018-10-10 PROCEDURE — 86900 BLOOD TYPING SEROLOGIC ABO: CPT | Performed by: DERMATOLOGY

## 2018-10-10 PROCEDURE — 99212 OFFICE O/P EST SF 10 MIN: CPT | Performed by: DERMATOLOGY

## 2018-10-10 PROCEDURE — 36415 COLL VENOUS BLD VENIPUNCTURE: CPT | Performed by: DERMATOLOGY

## 2018-10-10 PROCEDURE — 86901 BLOOD TYPING SEROLOGIC RH(D): CPT | Performed by: DERMATOLOGY

## 2018-10-10 RX ORDER — CLOBETASOL PROPIONATE 0.5 MG/G
AEROSOL, FOAM TOPICAL
Qty: 300 G | Refills: 3 | Status: SHIPPED | OUTPATIENT
Start: 2018-10-10 | End: 2021-11-12

## 2018-10-10 RX ORDER — TRIAMCINOLONE ACETONIDE 40 MG/ML
40 INJECTION, SUSPENSION INTRA-ARTICULAR; INTRAMUSCULAR ONCE
Status: COMPLETED | OUTPATIENT
Start: 2018-10-10 | End: 2018-10-10

## 2018-10-10 RX ADMIN — TRIAMCINOLONE ACETONIDE 40 MG: 40 INJECTION, SUSPENSION INTRA-ARTICULAR; INTRAMUSCULAR at 11:00:00

## 2018-10-10 NOTE — TELEPHONE ENCOUNTER
Disability form received in PAU. Logged for processing. PAU packet emailed to pt '[email or web address]'.  NK

## 2018-10-11 RX ORDER — VARENICLINE TARTRATE 1 MG/1
TABLET, FILM COATED ORAL
Qty: 180 TABLET | Refills: 0 | Status: SHIPPED | OUTPATIENT
Start: 2018-10-11 | End: 2019-05-13

## 2018-10-11 NOTE — TELEPHONE ENCOUNTER
Received fax from [facility] stating Rx is to be filled at [facility] and Rx was transferred. Fax sent to scan.

## 2018-10-12 ENCOUNTER — TELEPHONE (OUTPATIENT)
Dept: DERMATOLOGY CLINIC | Facility: CLINIC | Age: 28
End: 2018-10-12

## 2018-10-12 NOTE — TELEPHONE ENCOUNTER
Review pended refill request as it does not fall under a protocol.     Last Rx: historical  LOV: 7/9/18

## 2018-10-15 NOTE — TELEPHONE ENCOUNTER
I think he already received one. I think we are supposed to give them to the patients when they come in for training. Please check with pt. But he does need his meds--the Enbrel should be 2x a week.  Thanks K Yes

## 2018-10-21 NOTE — PROGRESS NOTES
Alana Lee is a 29year old male. Patient presents with:  Psoriasis: bilateral hands ,feet and some on head ,was better with new med  Cosentyx ,however patient needs a new rx             Augmentin [Amoxicillin-Pot Clavulanate];  Seasonal    Current education level: Not on file    Social Needs      Financial resource strain: Not on file      Food insecurity - worry: Not on file      Food insecurity - inability: Not on file      Transportation needs - medical: Not on file      Transportation needs - no stiffness. No fevers chills night sweats. No lymph node swelling, no chest pain, problems breathing, leg swelling. Physical examination:  Well-developed well-nourished patient alert oriented, to person place and time, in no acute distress.       Eryt (thirty) days.        Other psoriasis  (primary encounter diagnosis)  Psoriasis  Medication monitoring encounter  Plantar pustular psoriasis    Orders Placed This Encounter      Patient Blood Type      Results From Past 48 Hours:  No results found for this

## 2018-10-22 NOTE — TELEPHONE ENCOUNTER
Fax requesting clarification of quantity. Form completed per rx in Epic. 115 Krysta Parnell signed. Faxed to [employer].

## 2018-10-24 NOTE — TELEPHONE ENCOUNTER
Dr. Sina Malone,    Pt has been off work due to a fall at work  -  (Left knee pain) from 2/17/18 - 10/9/18. Please sign off on form if you approve:  -Highlight the patient and hit \"Chart\" button.   -In Chart Review, w/in the Encounter tab - click 1 time on

## 2018-10-29 NOTE — TELEPHONE ENCOUNTER
LOV 10/10/18 pt requesting refill for COSENTYX SENSOREADY 300 DOSE 150 MG/ML Subcutaneous Solution Auto-injector no f/u appt scheduled to date-JH

## 2018-11-06 ENCOUNTER — TELEPHONE (OUTPATIENT)
Dept: ORTHOPEDICS CLINIC | Facility: CLINIC | Age: 28
End: 2018-11-06

## 2018-11-15 ENCOUNTER — PATIENT MESSAGE (OUTPATIENT)
Dept: DERMATOLOGY CLINIC | Facility: CLINIC | Age: 28
End: 2018-11-15

## 2018-12-05 ENCOUNTER — TELEPHONE (OUTPATIENT)
Dept: DERMATOLOGY CLINIC | Facility: CLINIC | Age: 28
End: 2018-12-05

## 2018-12-05 NOTE — TELEPHONE ENCOUNTER
•  Secukinumab (COSENTYX SENSOREADY 300 DOSE) 150 MG/ML Subcutaneous Solution Auto-injector, Inject 2 pen into the skin every 30 (thirty) days. , Disp: 2 pen, Rfl: 11    Needs prior authorization:  Key: [id number]  Fax place in provider folder

## 2018-12-13 NOTE — TELEPHONE ENCOUNTER
Several attempts made to fax completed form to:  [phone] and [phone]. Please try faxing again. Left in PA box, third column.

## 2018-12-14 NOTE — TELEPHONE ENCOUNTER
PA general request form faxed. Confirmation received. Fax [phone].  Placed in [facility]

## 2018-12-15 ENCOUNTER — PATIENT MESSAGE (OUTPATIENT)
Dept: DERMATOLOGY CLINIC | Facility: CLINIC | Age: 28
End: 2018-12-15

## 2018-12-15 NOTE — TELEPHONE ENCOUNTER
Please initiate PA via telephone call per pt request. Phone number below.  PA department is closed today (Saturday)

## 2018-12-17 NOTE — TELEPHONE ENCOUNTER
Received fax from [address] regarding Cosentyx pen (2 pens) 150 mg/ml PEN INJCTR has been approved from 11/16/18 to 12/16/19    Placed in [facility] 6381

## 2019-05-12 ENCOUNTER — PATIENT MESSAGE (OUTPATIENT)
Dept: FAMILY MEDICINE CLINIC | Facility: CLINIC | Age: 29
End: 2019-05-12

## 2019-05-12 ENCOUNTER — PATIENT MESSAGE (OUTPATIENT)
Dept: DERMATOLOGY CLINIC | Facility: CLINIC | Age: 29
End: 2019-05-12

## 2019-05-12 DIAGNOSIS — Z71.89 HISTORY OF PARTICIPATION IN SMOKING CESSATION COUNSELING: ICD-10-CM

## 2019-05-13 RX ORDER — VARENICLINE TARTRATE 1 MG/1
1 TABLET, FILM COATED ORAL 2 TIMES DAILY
Qty: 180 TABLET | Refills: 1 | Status: SHIPPED | OUTPATIENT
Start: 2019-05-13 | End: 2021-11-12

## 2019-05-13 NOTE — TELEPHONE ENCOUNTER
From: Shaheen Infante  To: Lindsay Qrueshi DO  Sent: 5/12/2019 2:36 PM CDT  Subject: Prescription Question    Good afternoon are you able to prescribe me a prescription for Chantix? Thanks.

## 2019-05-13 NOTE — TELEPHONE ENCOUNTER
Dr. Garett Terry pls advise, pt last had chantix rx'd on 10/11/18. I renewed rx Chantix 1 MG po  BID with qty #180 with 1 additional refill. Please approve if you agree.

## 2019-05-14 NOTE — TELEPHONE ENCOUNTER
Okay to consider Deryl Gondola. Patient does need new QuantiFERON gold. Living in [place] currently. Injection site reactions still significant with the pen. Lana Phillips may need to be left out for at least 2 hours to help with this.   Will need updated insurance in

## 2019-05-14 NOTE — TELEPHONE ENCOUNTER
Pt has been on Enbrel, stelara, enbrel, humria , humira + MTX and now cosentyx. + topicals for palmar planart pustular psoriasis.

## 2019-05-14 NOTE — TELEPHONE ENCOUNTER
No answer when calling patient. How will he sign paperwork? Will inform patient needs quant gold. Taltz paperwork started.  In [facility] room

## 2019-07-02 ENCOUNTER — TELEPHONE (OUTPATIENT)
Dept: DERMATOLOGY CLINIC | Facility: CLINIC | Age: 29
End: 2019-07-02

## 2019-07-02 NOTE — TELEPHONE ENCOUNTER
Received taltz paperwork in mail. Faxed to [facility] and [facility].  In PA book

## 2019-07-10 NOTE — TELEPHONE ENCOUNTER
Clinic notes no longer needed, KMT please disregard prior request - Received fax from [facility] stating pt is approved for taltz $25 copay program.   Left detailed message informing pt of approval. Approval letter sent to scan.

## 2019-07-10 NOTE — TELEPHONE ENCOUNTER
Received fax requesting pts medication list. Faxed. Placed in [facility] book with [address] enrollment form.

## 2019-07-10 NOTE — TELEPHONE ENCOUNTER
Received fax from [facility] requesting 2-3 of the most current clinic notes. KMT to review and sign. Placed in [facility] box.

## 2019-07-19 NOTE — TELEPHONE ENCOUNTER
Received fax from [facility] 7/18 requesting last 2-3 most recent clinic notes to be faxed to [phone]. Placed in [address] [place] [address] (Triage Room).     S/w Shannon Aguilar (patient) he refused to fill medication Taltz on $25 card program at this time, he prefers to Lloyd Gaines

## 2019-07-20 ENCOUNTER — TELEPHONE (OUTPATIENT)
Dept: INTERNAL MEDICINE CLINIC | Facility: CLINIC | Age: 29
End: 2019-07-20

## 2019-07-20 NOTE — TELEPHONE ENCOUNTER
Secukinumab (COSENTYX SENSOREADY 300 DOSE) 150 MG/ML Subcutaneous Solution Auto-injector    [facility]

## 2019-07-22 NOTE — TELEPHONE ENCOUNTER
S/w pt - he did talk to Andrew [facility] and was informed that if he enters the $25 program he can get the medication for 3 years max.  Pt does not want to risk that, stating that if the medication works well, he would like to stay on it longer, therefore he

## 2019-07-23 ENCOUNTER — TELEPHONE (OUTPATIENT)
Dept: DERMATOLOGY CLINIC | Facility: CLINIC | Age: 29
End: 2019-07-23

## 2019-07-23 NOTE — TELEPHONE ENCOUNTER
Received a stack of paper from [address] regarding [address] in [place] [address]

## 2019-07-26 NOTE — TELEPHONE ENCOUNTER
Fax received by Js Kelley on 7/24/19 states PA has been approved from 6/23/19 until 10/22/2019 and pt was informed. Fax placed in binder.

## 2019-08-07 NOTE — TELEPHONE ENCOUNTER
Received fax on 7/24/2019 stating that PA for Anne Harp was approved 06/23/2019 until 10/22/2019. LM informing pt of approval. PA documents sent to scan.

## 2019-09-10 ENCOUNTER — TELEPHONE (OUTPATIENT)
Dept: DERMATOLOGY CLINIC | Facility: CLINIC | Age: 29
End: 2019-09-10

## 2019-09-10 NOTE — TELEPHONE ENCOUNTER
PA for taltz required, forms received from [employer] (last PA approved until 10/22/19) will need a new one for continuation of therapy

## 2019-09-25 NOTE — TELEPHONE ENCOUNTER
[facility], [facility] was never received. Refaxed form. Placed in [address] book. Await response.

## 2019-10-02 NOTE — TELEPHONE ENCOUNTER
Call received from [facility] [address] [place] [address] asking to validate Taltz prescription that was sent to them - the tech Deirdre Owen could not provide where it was transferred from (most likely [address]) S/w pt to verify if he is ok using [facility] - pt does NOT wasn't to use [facility]

## 2019-10-03 NOTE — TELEPHONE ENCOUNTER
Pt should be done with load, this rx is form maintenance--rx sent . Ok to send [employer] with info/ update on condition.

## 2019-10-03 NOTE — TELEPHONE ENCOUNTER
Please call [facility] to verify loading dose for Taltz. The records there show pt should be on week 12 of the loading dose and then maintenance pack.  Please call     [phone]

## 2019-10-04 ENCOUNTER — PATIENT MESSAGE (OUTPATIENT)
Dept: DERMATOLOGY CLINIC | Facility: CLINIC | Age: 29
End: 2019-10-04

## 2019-10-12 NOTE — PROGRESS NOTES
[facility] or should this go to the actual [facility]? Sent to both ( obviously only 1 should fill this) . Please confirm this went through at 1     Does he need an additional topical for the groin?   Message sent--pt does pr

## 2019-10-14 NOTE — TELEPHONE ENCOUNTER
S/w Nell at [facility] [address] - they will process Taltz - please see pt's message r/e additional treatment for his groin.

## 2019-10-15 RX ORDER — CALCIPOTRIENE 50 UG/G
CREAM TOPICAL
Qty: 180 G | Refills: 3 | Status: SHIPPED | OUTPATIENT
Start: 2019-10-15 | End: 2019-10-29

## 2019-10-15 RX ORDER — TACROLIMUS 1 MG/G
OINTMENT TOPICAL
Qty: 100 G | Refills: 12 | Status: SHIPPED | OUTPATIENT
Start: 2019-10-15 | End: 2019-10-29

## 2019-10-16 NOTE — TELEPHONE ENCOUNTER
[facility] contacted PA completed over the phone, approved until 10/2/2020. Case ID # [id number]  Informed we will receive an approval letter via fax, and they will inform pt of approval. PA forms sent to scan.

## 2019-10-19 NOTE — PROGRESS NOTES
Received paperwork from [facility] stating they cannot approve the Taltz autoinjector at this time, pls see paperwork in [facility] box

## 2019-10-21 NOTE — TELEPHONE ENCOUNTER
S/w Thalia from [facility] Jolene Fuller 226 - she has a paid claim on both Taltz Rxs starter and maintenance - no further action required.

## 2019-10-29 RX ORDER — TACROLIMUS 1 MG/G
OINTMENT TOPICAL
Qty: 100 G | Refills: 12 | Status: SHIPPED | OUTPATIENT
Start: 2019-10-29 | End: 2021-11-12

## 2019-10-29 RX ORDER — CALCIPOTRIENE 50 UG/G
CREAM TOPICAL
Qty: 180 G | Refills: 3 | Status: SHIPPED | OUTPATIENT
Start: 2019-10-29 | End: 2021-11-12

## 2020-01-02 ENCOUNTER — TELEPHONE (OUTPATIENT)
Dept: DERMATOLOGY CLINIC | Facility: CLINIC | Age: 30
End: 2020-01-02

## 2020-01-02 DIAGNOSIS — Z51.81 MEDICATION MONITORING ENCOUNTER: ICD-10-CM

## 2020-01-02 DIAGNOSIS — L40.0 PSORIASIS VULGARIS: Primary | ICD-10-CM

## 2020-01-02 NOTE — TELEPHONE ENCOUNTER
Please remind pt due for tb test in May. Last 4/2018.   rx sent to [employer]

## 2020-01-02 NOTE — TELEPHONE ENCOUNTER
Pt states was informed that PA is needed for Taltz. Pt states should be refills from last prescription in October 2019.  Please call

## 2020-01-02 NOTE — TELEPHONE ENCOUNTER
Pt informed of info below. Voiced understanding. Pt states he will have TB test done with his PCP and have results faxed to our office. [place] at [place].

## 2020-01-09 NOTE — TELEPHONE ENCOUNTER
Information needed for Byron Bright- [facility]    Pa was approved for maintenance dose    Loading dosage needs PA. States there are 3 steps that have to be authorized before dispensing medication.  Please call pt

## 2020-01-13 NOTE — TELEPHONE ENCOUNTER
S/w Whitney Lawler, [address] [place] [address] 10/10/18, pt states his last dose of Pavan Duhamel was beginning of October 2019 and then \"it wasn't approved anymore\". Pt requests new PA for starter and maintenance - states his psoriasis is flaring up and is \"everywhere\".  Please advise on thi

## 2020-01-14 NOTE — TELEPHONE ENCOUNTER
Please see if he is still eligible for Tung together. Ok to try pa--he should be able to do his quant gold ( or a documented intradermal PPD at his PCP would work also)  And send us the results. Ok to try PA. He is due for an appt.   We could send a taper

## 2020-01-15 RX ORDER — PREDNISONE 10 MG/1
TABLET ORAL
Qty: 30 TABLET | Refills: 0 | Status: SHIPPED | OUTPATIENT
Start: 2020-01-15 | End: 2020-04-29

## 2020-01-16 NOTE — TELEPHONE ENCOUNTER
S/w pt. He does live in [facility] and will get a TB test done there this week and have his results faxed to us. He did want the prednisone sent which I did.  He will be back in [address] [place] [address] but he's willing to fly in if he needs to be seen tyrese

## 2020-01-17 NOTE — TELEPHONE ENCOUNTER
Fax from [facility] for [address] autoinjector from 12/16/2019-4/15/2020.  Placed fax in pa inbox

## 2020-01-21 NOTE — TELEPHONE ENCOUNTER
Faxes reviewed, we did receive an approval letter for Phil Peng with dates 12/16/19 through 04/15/20.  According to pt (see prior msgs) the plan was still in need of PA, along with the approval letter, we did in fact receive PA forms, which I just filled out, w

## 2020-01-23 ENCOUNTER — TELEPHONE (OUTPATIENT)
Dept: DERMATOLOGY CLINIC | Facility: CLINIC | Age: 30
End: 2020-01-23

## 2020-01-23 NOTE — TELEPHONE ENCOUNTER
Prior Authorization requested:    Current Outpatient Medications   Medication Sig Dispense Refill   • Ixekizumab (TALTZ) 80 MG/ML Subcutaneous Solution Auto-injector Inject 1 mL into the skin every 28 days.  1 mL 11

## 2020-01-24 NOTE — TELEPHONE ENCOUNTER
Pt contacted by [employer] for delivery of Idejasone Vitaliy. No further action necessary. Pt confirmed he is getting his TB test done (PPD) on 1/29/2020 and will contact us as soon as he has his results. PA forms and approval sent to scan.

## 2020-02-22 ENCOUNTER — PATIENT MESSAGE (OUTPATIENT)
Dept: DERMATOLOGY CLINIC | Facility: CLINIC | Age: 30
End: 2020-02-22

## 2020-02-22 NOTE — TELEPHONE ENCOUNTER
Still need TB test/ quant gold results. Ok to send message to pt--he can mail or fax if done near home.

## 2020-03-17 ENCOUNTER — PATIENT MESSAGE (OUTPATIENT)
Dept: DERMATOLOGY CLINIC | Facility: CLINIC | Age: 30
End: 2020-03-17

## 2020-04-06 ENCOUNTER — TELEPHONE (OUTPATIENT)
Dept: DERMATOLOGY CLINIC | Facility: CLINIC | Age: 30
End: 2020-04-06

## 2020-04-06 DIAGNOSIS — Z51.81 MEDICATION MONITORING ENCOUNTER: ICD-10-CM

## 2020-04-06 DIAGNOSIS — L40.50 PSORIATIC ARTHRITIS (HCC): ICD-10-CM

## 2020-04-06 DIAGNOSIS — L40.0 PSORIASIS VULGARIS: Primary | ICD-10-CM

## 2020-04-06 NOTE — TELEPHONE ENCOUNTER
Patient states was advised by a doctor to stop taking his Leverne Danas due to the Covid-19 virus. Wants to know what Oracio Parnell thinks.  Please call

## 2020-04-06 NOTE — TELEPHONE ENCOUNTER
Spoke with patient has been home on quarantine due to exposure to COVID-19. Is scheduled to go back to work on Thursday. Physicians from work have advised that he should stop his medication. Reviewed pros and cons with patient.   Short-term may not dec

## 2020-04-06 NOTE — TELEPHONE ENCOUNTER
Current guidelines are to continue if no symptoms due to the possibility with extended discontinuation of medications psoriasis, arthritis may worsen and may not be controlled by previously effective medication.           Due to severity of his psoriasis th

## 2020-04-06 NOTE — TELEPHONE ENCOUNTER
S/w pt, pt works in law enforcement. States his on-site physician informed him that he should stop Taltz due to the pandemic. Pt does not have any flu like symptoms, denies cough, fever, sore throat, fatique, weakness.  He does not know if he was exposed to

## 2020-04-29 RX ORDER — PREDNISONE 10 MG/1
TABLET ORAL
Qty: 30 TABLET | Refills: 0 | Status: SHIPPED | OUTPATIENT
Start: 2020-04-29 | End: 2021-11-12

## 2020-04-30 NOTE — TELEPHONE ENCOUNTER
Spoke with pt and he states psoriasis in groin and buttocks area inflamed and irritated. Pt states the prednisone helped stop the itching and inflammation but not the redness and as soon as he finished the rx, the symptoms returned.   Pt states he will den

## 2020-05-05 NOTE — TELEPHONE ENCOUNTER
Left message for nurse at pt's PCP (Dr. Sourav Rucker) office stating we still haven't received pt's TB results. Waiting for cb from nurse at pt's PCP office.

## 2020-05-12 ENCOUNTER — TELEPHONE (OUTPATIENT)
Dept: DERMATOLOGY CLINIC | Facility: CLINIC | Age: 30
End: 2020-05-12

## 2020-05-12 NOTE — TELEPHONE ENCOUNTER
Based on previous encounters, it looks like pt stopped Taltz due to [place] exposure. Also, looks like we are still waiting for pt's TB results from pt's PCP. Please advise if we should proceed with PA. Thank you.

## 2020-05-12 NOTE — TELEPHONE ENCOUNTER
Fax from [facility]    Pa required for Ixekizumab (TALTZ) 80 MG/ML Subcutaneous Solution Auto-injector    Key: [id number] fax in pa inbox

## 2020-05-13 NOTE — TELEPHONE ENCOUNTER
There is no literature support for stopping biologics \"prophylactically\" during the [place] -19 outbreak      TB test results will be needed for PA    Ok to continue process for  PA, hold dose if symptomatic.

## 2020-05-19 RX ORDER — FLUCONAZOLE 100 MG/1
100 TABLET ORAL DAILY
Qty: 14 TABLET | Refills: 1 | Status: SHIPPED | OUTPATIENT
Start: 2020-05-19 | End: 2021-11-12

## 2020-05-19 NOTE — TELEPHONE ENCOUNTER
Noted. Await ppd results. Thank you    Can try fluconazole po to see if helpful.  [facility]     Message sent    Can send additional topicals if needed

## 2020-05-22 NOTE — TELEPHONE ENCOUNTER
Pt supplied TB test results - negative as of 5/21/2020. PA submitted on [employer]. com - Key [id number]. Awaiting determination.

## 2020-05-29 NOTE — TELEPHONE ENCOUNTER
Tariq Lieberman approved through 5/22/2021. Pt informed and will contact pharmacy to set up shipment.

## 2020-06-18 ENCOUNTER — PATIENT MESSAGE (OUTPATIENT)
Dept: DERMATOLOGY CLINIC | Facility: CLINIC | Age: 30
End: 2020-06-18

## 2020-06-18 NOTE — TELEPHONE ENCOUNTER
From: Apryl Ashton  To: Jules Grover MD  Sent: 6/18/2020 1:55 PM CDT  Subject: Other    Good afternoon,    Am I able to send over [facility] paperwork to be filled out for my psoriasis to have days of intermittent when I have flare ups? Thank you.

## 2020-06-23 NOTE — TELEPHONE ENCOUNTER
[facility], he did have [employer] forms in the distant past for his psoriasis, psoriatic arthritis.

## 2020-07-31 ENCOUNTER — TELEPHONE (OUTPATIENT)
Dept: ADMINISTRATIVE | Age: 30
End: 2020-07-31

## 2020-07-31 NOTE — TELEPHONE ENCOUNTER
Please let patient know. He will need to discuss with his PCP there, and or find a new Dermatologist locally.

## 2020-07-31 NOTE — TELEPHONE ENCOUNTER
Dr. Lachelle Baca,    The forms department rec'd  FMLA forms for this patient who lives out of state. Per FMLA guidelines we do not do out of state FMLA and he would need an appointment within 15 days of filing his FMLA.  I saw your messages to patient and sounds

## 2020-11-03 ENCOUNTER — OTHER (OUTPATIENT)
Dept: URBAN - METROPOLITAN AREA CLINIC 19 | Facility: CLINIC | Age: 30
Setting detail: DERMATOLOGY
End: 2020-11-03

## 2020-11-03 DIAGNOSIS — B07.9 VIRAL WART, UNSPECIFIED: ICD-10-CM

## 2020-11-03 PROCEDURE — 99202 OFFICE O/P NEW SF 15 MIN: CPT

## 2020-11-30 ENCOUNTER — RX ONLY (RX ONLY)
Age: 30
End: 2020-11-30

## 2020-11-30 RX ORDER — APREMILAST 30 MG/1
1 TABLET TABLET, FILM COATED ORAL TWICE A DAY
Qty: 60 | Refills: 6
Start: 2020-11-30

## 2020-12-08 ENCOUNTER — OTHER (OUTPATIENT)
Dept: URBAN - METROPOLITAN AREA CLINIC 19 | Facility: CLINIC | Age: 30
Setting detail: DERMATOLOGY
End: 2020-12-08

## 2020-12-08 ENCOUNTER — RX ONLY (RX ONLY)
Age: 30
End: 2020-12-08

## 2020-12-08 DIAGNOSIS — D48.5 NEOPLASM OF UNCERTAIN BEHAVIOR OF SKIN: ICD-10-CM

## 2020-12-08 PROCEDURE — 99212 OFFICE O/P EST SF 10 MIN: CPT

## 2020-12-08 RX ORDER — DESONIDE 0.5 MG/G
CREAM TOPICAL
Qty: 60 | Refills: 1
Start: 2020-12-08

## 2020-12-22 ENCOUNTER — FOLLOW-UP (OUTPATIENT)
Dept: URBAN - METROPOLITAN AREA CLINIC 19 | Facility: CLINIC | Age: 30
Setting detail: DERMATOLOGY
End: 2020-12-22

## 2020-12-22 DIAGNOSIS — B07.8 OTHER VIRAL WARTS: ICD-10-CM

## 2020-12-22 PROCEDURE — 36415 COLL VENOUS BLD VENIPUNCTURE: CPT

## 2020-12-22 PROCEDURE — 99212 OFFICE O/P EST SF 10 MIN: CPT

## 2021-01-01 ENCOUNTER — EXTERNAL RECORD (OUTPATIENT)
Dept: HEALTH INFORMATION MANAGEMENT | Facility: OTHER | Age: 31
End: 2021-01-01

## 2021-01-14 ENCOUNTER — OTHER (OUTPATIENT)
Dept: URBAN - METROPOLITAN AREA CLINIC 19 | Facility: CLINIC | Age: 31
Setting detail: DERMATOLOGY
End: 2021-01-14

## 2021-01-14 DIAGNOSIS — D22.5 MELANOCYTIC NEVI OF TRUNK: ICD-10-CM

## 2021-01-14 DIAGNOSIS — D18.01 HEMANGIOMA OF SKIN AND SUBCUTANEOUS TISSUE: ICD-10-CM

## 2021-01-14 DIAGNOSIS — L82.1 OTHER SEBORRHEIC KERATOSIS: ICD-10-CM

## 2021-01-14 DIAGNOSIS — L81.4 OTHER MELANIN HYPERPIGMENTATION: ICD-10-CM

## 2021-01-14 PROCEDURE — 96372 THER/PROPH/DIAG INJ SC/IM: CPT

## 2021-01-27 ENCOUNTER — TELEPHONE (OUTPATIENT)
Dept: DERMATOLOGY CLINIC | Facility: CLINIC | Age: 31
End: 2021-01-27

## 2021-02-01 DIAGNOSIS — I25.119 CORONARY ARTERY DISEASE WITH ANGINA PECTORIS, UNSPECIFIED VESSEL OR LESION TYPE, UNSPECIFIED WHETHER NATIVE OR TRANSPLANTED HEART (CMD): Primary | ICD-10-CM

## 2021-02-08 ENCOUNTER — OTHER (OUTPATIENT)
Dept: URBAN - METROPOLITAN AREA CLINIC 19 | Facility: CLINIC | Age: 31
Setting detail: DERMATOLOGY
End: 2021-02-08

## 2021-02-08 DIAGNOSIS — C44.519 BASAL CELL CARCINOMA OF SKIN OF OTHER PART OF TRUNK: ICD-10-CM

## 2021-02-08 PROCEDURE — 96372 THER/PROPH/DIAG INJ SC/IM: CPT

## 2021-02-11 ENCOUNTER — ANCILLARY PROCEDURE (OUTPATIENT)
Dept: CARDIOLOGY | Age: 31
End: 2021-02-11
Attending: NURSE PRACTITIONER

## 2021-02-11 DIAGNOSIS — I25.119 CORONARY ARTERY DISEASE WITH ANGINA PECTORIS, UNSPECIFIED VESSEL OR LESION TYPE, UNSPECIFIED WHETHER NATIVE OR TRANSPLANTED HEART (CMD): ICD-10-CM

## 2021-02-11 LAB
HEART RATE RESERVE PREDICTED: 7.37 BPM
PEAK HR ACHIEVED: 176 BPM
RESTING HR ACHIEVED: 69 BPM
STRESS BASELINE BP: NORMAL MMHG
STRESS PERCENT HR: 93 %
STRESS POST ESTIMATED WORKLOAD: 11.3 METS
STRESS POST EXERCISE DUR MIN: 13 MIN
STRESS POST EXERCISE DUR SEC: 8 SEC
STRESS POST PEAK BP: NORMAL MMHG
STRESS TARGET HR: 190 BPM

## 2021-02-11 PROCEDURE — 93015 CV STRESS TEST SUPVJ I&R: CPT | Performed by: INTERNAL MEDICINE

## 2021-02-11 ASSESSMENT — EXERCISE STRESS TEST
PEAK_RPP: 8970
PEAK_RPP: 19684
STAGE_CATEGORIES: 2
PEAK_RPP: 14170
PEAK_HR: 133
PEAK_BP: 144/80
STAGE_CATEGORIES: RECOVERY 2
MPH: 2.5
PEAK_HR: 106
STAGE_CATEGORIES: 4
MPH: 1.7
PEAK_RPP: 26544
STAGE_CATEGORIES: 1
STAGE_CATEGORIES: 5
STAGE_CATEGORIES: RESTING
GRADE: 16
PEAK_BP: 160/80
PEAK_BP: 130/80
PEAK_HR: 131
PEAK_BP: 128/80
GRADE: 12
STAGE_CATEGORIES: RECOVERY 0
MPH: 4.2
PEAK_HR: 69
PEAK_HR: 109
GRADE: 14
MPH: 3.4
GRADE: 10
PEAK_BP: 148/80
PEAK_BP: 130/80
PEAK_HR: 176
STAGE_CATEGORIES: RECOVERY 1
PEAK_BP: 130/70
PEAK_RPP: 18078
PEAK_BP: 158/80
STAGE_CATEGORIES: 3
PEAK_HR: 168
PEAK_RPP: 15264
PEAK_RPP: 12672
PEAK_RPP: 28160
PEAK_METS: 11.3
PEAK_HR: 176
PEAK_BP: 138/82
PEAK_HR: 99
PEAK_RPP: 22880

## 2021-02-12 ENCOUNTER — LAB REQUISITION (OUTPATIENT)
Dept: LAB | Age: 31
End: 2021-02-12

## 2021-02-12 PROCEDURE — PSEU9049 QUANTIFERON TB PLUS: Performed by: CLINICAL MEDICAL LABORATORY

## 2021-02-12 PROCEDURE — 86480 TB TEST CELL IMMUN MEASURE: CPT | Performed by: CLINICAL MEDICAL LABORATORY

## 2021-02-14 LAB
GAMMA INTERFERON BACKGROUND BLD IA-ACNC: 0.05 IU/ML
M TB IFN-G BLD-IMP: NEGATIVE
M TB IFN-G CD4+ BCKGRND COR BLD-ACNC: 0 IU/ML
M TB IFN-G CD4+CD8+ BCKGRND COR BLD-ACNC: 0 IU/ML
MITOGEN IGNF BCKGRD COR BLD-ACNC: 8.95 IU/ML

## 2021-05-10 RX ORDER — FLUOROURACIL 50 MG/ML
A SMALL AMOUNT SOLUTION TOPICAL ONCE A DAY
Qty: 10 | Refills: 5
Start: 2021-05-10

## 2021-11-12 ENCOUNTER — LAB ENCOUNTER (OUTPATIENT)
Dept: LAB | Age: 31
End: 2021-11-12
Attending: FAMILY MEDICINE
Payer: COMMERCIAL

## 2021-11-12 ENCOUNTER — OFFICE VISIT (OUTPATIENT)
Dept: FAMILY MEDICINE CLINIC | Facility: CLINIC | Age: 31
End: 2021-11-12

## 2021-11-12 VITALS
WEIGHT: 181 LBS | SYSTOLIC BLOOD PRESSURE: 114 MMHG | HEART RATE: 65 BPM | BODY MASS INDEX: 24.52 KG/M2 | HEIGHT: 72 IN | DIASTOLIC BLOOD PRESSURE: 75 MMHG

## 2021-11-12 DIAGNOSIS — Z80.9 FAMILY HISTORY OF CANCER: ICD-10-CM

## 2021-11-12 DIAGNOSIS — Z00.00 ANNUAL PHYSICAL EXAM: Primary | ICD-10-CM

## 2021-11-12 DIAGNOSIS — Z00.00 ANNUAL PHYSICAL EXAM: ICD-10-CM

## 2021-11-12 DIAGNOSIS — L40.9 PSORIASIS: ICD-10-CM

## 2021-11-12 PROCEDURE — 3078F DIAST BP <80 MM HG: CPT | Performed by: FAMILY MEDICINE

## 2021-11-12 PROCEDURE — 3074F SYST BP LT 130 MM HG: CPT | Performed by: FAMILY MEDICINE

## 2021-11-12 PROCEDURE — 80053 COMPREHEN METABOLIC PANEL: CPT

## 2021-11-12 PROCEDURE — 99385 PREV VISIT NEW AGE 18-39: CPT | Performed by: FAMILY MEDICINE

## 2021-11-12 PROCEDURE — 85025 COMPLETE CBC W/AUTO DIFF WBC: CPT

## 2021-11-12 PROCEDURE — 84443 ASSAY THYROID STIM HORMONE: CPT

## 2021-11-12 PROCEDURE — 36415 COLL VENOUS BLD VENIPUNCTURE: CPT

## 2021-11-12 PROCEDURE — 3008F BODY MASS INDEX DOCD: CPT | Performed by: FAMILY MEDICINE

## 2021-11-12 RX ORDER — FLUCONAZOLE 100 MG/1
100 TABLET ORAL DAILY
Qty: 7 TABLET | Refills: 0 | Status: SHIPPED | OUTPATIENT
Start: 2021-11-12 | End: 2021-12-17

## 2021-11-12 RX ORDER — CLOTRIMAZOLE AND BETAMETHASONE DIPROPIONATE 10; .64 MG/G; MG/G
CREAM TOPICAL
Qty: 60 G | Refills: 0 | Status: SHIPPED | OUTPATIENT
Start: 2021-11-12

## 2021-11-12 NOTE — PROGRESS NOTES
Subjective:   Patient ID: Jayson Johnson is a 32year old male.     Here for annual physical.   Wants to see if any tests need to be done due to family history cancer  Has rash in the crease around the anus very itchy  Personal history of psoriasis  Needs t Signed Prescriptions Disp Refills   • fluconazole 100 MG Oral Tab 7 tablet 0     Sig: Take 1 tablet (100 mg total) by mouth daily.    • clotrimazole-betamethasone 1-0.05 % External Cream 60 g 0     Sig: Apply bid for 7 days then prn       Imaging & Refe

## 2021-11-20 ENCOUNTER — PATIENT MESSAGE (OUTPATIENT)
Dept: FAMILY MEDICINE CLINIC | Facility: CLINIC | Age: 31
End: 2021-11-20

## 2021-11-20 NOTE — TELEPHONE ENCOUNTER
Se other MyChart encounter,duplicate message. From: Aye Munoz  To:  Leilani Hurt DO  Sent: 11/20/2021 12:55 AM CST  Subject: Question regarding COMP METABOLIC PANEL (14)    Good morning,    For my test results, looking at my glucose level i

## 2021-11-24 ENCOUNTER — OFFICE VISIT (OUTPATIENT)
Dept: FAMILY MEDICINE CLINIC | Facility: CLINIC | Age: 31
End: 2021-11-24

## 2021-11-24 VITALS
HEART RATE: 95 BPM | RESPIRATION RATE: 16 BRPM | OXYGEN SATURATION: 100 % | TEMPERATURE: 98 F | SYSTOLIC BLOOD PRESSURE: 139 MMHG | BODY MASS INDEX: 25.73 KG/M2 | HEIGHT: 72 IN | WEIGHT: 190 LBS | DIASTOLIC BLOOD PRESSURE: 79 MMHG

## 2021-11-24 DIAGNOSIS — H60.502 ACUTE OTITIS EXTERNA OF LEFT EAR, UNSPECIFIED TYPE: Primary | ICD-10-CM

## 2021-11-24 PROCEDURE — 99202 OFFICE O/P NEW SF 15 MIN: CPT | Performed by: NURSE PRACTITIONER

## 2021-11-24 PROCEDURE — 3075F SYST BP GE 130 - 139MM HG: CPT | Performed by: NURSE PRACTITIONER

## 2021-11-24 PROCEDURE — 3078F DIAST BP <80 MM HG: CPT | Performed by: NURSE PRACTITIONER

## 2021-11-24 PROCEDURE — 3008F BODY MASS INDEX DOCD: CPT | Performed by: NURSE PRACTITIONER

## 2021-11-24 RX ORDER — CIPROFLOXACIN AND DEXAMETHASONE 3; 1 MG/ML; MG/ML
4 SUSPENSION/ DROPS AURICULAR (OTIC) 2 TIMES DAILY
Qty: 7.5 ML | Refills: 0 | Status: SHIPPED | OUTPATIENT
Start: 2021-11-24 | End: 2021-12-01

## 2021-11-24 NOTE — PROGRESS NOTES
CHIEF COMPLAINT:   Patient presents with:  Ear Problem: Pain in left ear. - Entered by patient; X 4-5 days      HPI:   Greg Crockett is a 32year old male who presents to clinic today with complaints of left ear pain. Has had for 4-5 days  days.  Pain is 16   Ht 6' (1.829 m)   Wt 190 lb (86.2 kg)   SpO2 100%   BMI 25.77 kg/m²   GENERAL: well developed, well nourished,in no apparent distress  SKIN: no rashes,no suspicious lesions  HEAD: atraumatic, normocephalic  EYES: conjunctiva clear, EOM intact  EARS: l emergency care. Patient voiced understanding    Increase fluids and rest.     May consider OTC tylenol or ibuprofen as needed and directed on packaging for pain/fever    Risks, benefits, and side effects of medication discussed.  Patient verbalized Katie has cleared. Prevention  · Keep your ears dry. This helps lower the risk of infection. Dry your ears with a towel or hair dryer after getting wet. Also, use ear plugs when swimming. · Don't stick any objects in the ear to remove wax.   · Talk with your well.  If you forget to take a dose on time, take it as soon as you remember. If it is almost time for the next dose, do not take the missed dose. Return to your normal dosing schedule. Do not take 2 doses of this medicine at one time.   Please tell your do available in [place]. Scan this code on your smartphone or tablet or use the web address below. You can also ask your pharmacist for a printout.  If you have any questions, please ask your pharmacist.   © 2021 [address]              Patient voiced

## 2021-11-24 NOTE — PATIENT INSTRUCTIONS
External Ear Infection (Adult)    External otitis (also called “swimmer’s ear”) is an infection in the ear canal. It's often caused by bacteria or fungus. It can occur a few days after water gets trapped in the ear canal (from swimming or bathing).  It ca to seek medical advice  Call your healthcare provider right away if any of these occur:   · Ear pain becomes worse or doesn’t improve after 3 days of treatment  · Redness or swelling of the outer ear occurs or gets worse  · Headache  · Fever of 100.4ºF (38 not use the medication any more than instructed. Tell the doctor or pharmacist if you are pregnant, planning to be pregnant, or breastfeeding. Ask your pharmacist if this medicine can interact with any of your other medicines.  Be sure to tell them about

## 2021-11-27 ENCOUNTER — TELEPHONE (OUTPATIENT)
Dept: DERMATOLOGY CLINIC | Facility: CLINIC | Age: 31
End: 2021-11-27

## 2021-11-27 ENCOUNTER — OFFICE VISIT (OUTPATIENT)
Dept: DERMATOLOGY CLINIC | Facility: CLINIC | Age: 31
End: 2021-11-27

## 2021-11-27 DIAGNOSIS — L40.0 PSORIASIS VULGARIS: Primary | ICD-10-CM

## 2021-11-27 DIAGNOSIS — Z51.81 MEDICATION MONITORING ENCOUNTER: ICD-10-CM

## 2021-11-27 PROCEDURE — 99203 OFFICE O/P NEW LOW 30 MIN: CPT | Performed by: DERMATOLOGY

## 2021-11-27 RX ORDER — KETOCONAZOLE 20 MG/G
1 CREAM TOPICAL 2 TIMES DAILY
Qty: 60 G | Refills: 3 | Status: SHIPPED | OUTPATIENT
Start: 2021-11-27

## 2021-12-02 NOTE — TELEPHONE ENCOUNTER
Call: [phone]  Fax: [phone]    The PA form sent to [facility] did not have the loading dose checked off.   Please re-fax form

## 2021-12-03 NOTE — TELEPHONE ENCOUNTER
Fax from [facility]    rx clarification request for [employer].  Placed fax in pa inbox

## 2021-12-06 NOTE — TELEPHONE ENCOUNTER
Fax reviewed - enrollment form for skyrizi does not have sig marked off - form placed in KMT's inbasket - Dr. Garcia Loges please see form in your office, thank you

## 2021-12-09 NOTE — TELEPHONE ENCOUNTER
Please check off standard rx and 2 rf's on maintenance,  this should be the pen's, will check tomorrow

## 2021-12-09 NOTE — TELEPHONE ENCOUNTER
Per KMT's order - [facility]  form updated with appropriate sig and re-faxed to [facility] on 12/9/21 and placed in the [facility] PA binder.

## 2021-12-12 NOTE — PROGRESS NOTES
Kell Schumacher is a 32year old male. Patient presents with:  Psoriasis: LOV 11/2018. Patient present for f/u of psoriasis. Using Clotrimazole/betamethasone to rectal area. Other areas affected area scalp, nails, and groin.  Another doctor ally'kasey Montiel Past Surgical History:   Procedure Laterality Date   • RECONSTR MANDIBLE,BONE PLATE Right 1010     Social History    Socioeconomic History      Marital status:       Spouse name: Not on file      Number of children: 1      Years of education: No and fluconazole tabs for rash in retctal area. Itching improved.  Patient was on skyrizi x 1 year ago with improvement and would like to restart this med    Past notes/ records and appropriate/relevant lab results including pathology and past body maps revi improved control currently with clobetasol and fluconazole. Continue with this. Plan to resume Skyrizi. No arthritis complaints at this time. Due for Quant gold. Aware of risks present has discussed. Long-term management goals reviewed.   Patient is orders of the defined types were placed in this encounter. Ricky Draper)  2020 09:40:00 Magnolia Sanchez)  2020 15:18:40

## 2021-12-13 NOTE — TELEPHONE ENCOUNTER
Fax reviewed - [facility] TATE'S LIST asking for med list, allergies etc - all faxed as requested

## 2021-12-16 ENCOUNTER — APPOINTMENT (OUTPATIENT)
Dept: GENETICS | Facility: HOSPITAL | Age: 31
End: 2021-12-16
Payer: COMMERCIAL

## 2021-12-16 ENCOUNTER — APPOINTMENT (OUTPATIENT)
Dept: HEMATOLOGY/ONCOLOGY | Facility: HOSPITAL | Age: 31
End: 2021-12-16
Payer: COMMERCIAL

## 2021-12-17 ENCOUNTER — OFFICE VISIT (OUTPATIENT)
Dept: FAMILY MEDICINE CLINIC | Facility: CLINIC | Age: 31
End: 2021-12-17

## 2021-12-17 VITALS
HEIGHT: 72 IN | DIASTOLIC BLOOD PRESSURE: 82 MMHG | WEIGHT: 180 LBS | BODY MASS INDEX: 24.38 KG/M2 | OXYGEN SATURATION: 99 % | HEART RATE: 86 BPM | TEMPERATURE: 98 F | SYSTOLIC BLOOD PRESSURE: 132 MMHG

## 2021-12-17 DIAGNOSIS — Z20.822 SUSPECTED COVID-19 VIRUS INFECTION: Primary | ICD-10-CM

## 2021-12-17 DIAGNOSIS — J02.9 SORE THROAT: ICD-10-CM

## 2021-12-17 PROCEDURE — 3079F DIAST BP 80-89 MM HG: CPT | Performed by: PHYSICIAN ASSISTANT

## 2021-12-17 PROCEDURE — 3008F BODY MASS INDEX DOCD: CPT | Performed by: PHYSICIAN ASSISTANT

## 2021-12-17 PROCEDURE — 87880 STREP A ASSAY W/OPTIC: CPT | Performed by: PHYSICIAN ASSISTANT

## 2021-12-17 PROCEDURE — 3075F SYST BP GE 130 - 139MM HG: CPT | Performed by: PHYSICIAN ASSISTANT

## 2021-12-17 PROCEDURE — 99213 OFFICE O/P EST LOW 20 MIN: CPT | Performed by: PHYSICIAN ASSISTANT

## 2021-12-17 NOTE — PATIENT INSTRUCTIONS
Coronavirus Disease 2019 (COVID-19)     [facility] is committed to the safety and well-being of our patients, members, employees, and communities.  As concerns arise about the new strain of coronavirus that causes COVID-19, [facility] exposure  • After day 7 from date of last exposure with a negative test result (test must occur on day 5 or later)  After stopping quarantine, you should  • Watch for symptoms until 14 days after exposure.   • If you have symptoms, immediately self-isolate Care     If you are awaiting test results or are confirmed positive for COVID -19, and your symptoms worsen at home with symptoms such as: extreme weakness, difficult breathing, or unrelenting fevers greater than 100.4 degrees Fahrenheit, you should contac Follow-up  If you are diagnosed with COVID, refrain from exercise until approved by your primary care provider. Please call your primary care provider within 2 days of your discharge to arrange for a telehealth follow-up.  CDC does not recommend repeat test Control & Prevention (CDC)  10 things you can do to manage your health at home, Cindy.nl. pdf  [employer][email or web address] Retrieved March 17, 2021, from https://health.[facility]/coronavirus/covid-19-information/covid-19-long-haulers. html  Long-term effects of covid-19. (n.d.).  Retrieved May 11, 2021, from [employer]

## 2021-12-17 NOTE — PROGRESS NOTES
CHIEF COMPLAINT:   Patient presents with:  Covid: sore throat, fatigue, chills, headache, cough x 2 dys       HPI:   Alana Lee is a 32year old male who presents with symptoms as described below for 2 days.    Sx onset 12/15    • Fever:     Yes [x]  ta Jerilyn [address] [place] [address] Right [phone]         Social History    Tobacco Use      Smoking status: Current Some Day Smoker        Packs/day: 0.25        Years: 9.00        Pack years: 2.25        Types: Cigarettes      Smokeless tobacco: Never Used    Vapi Suspected covid-19 virus infection  (primary encounter diagnosis)  Sore throat    PLAN:    Neg strep testing. Highly suspect COVID infection. COVID-19 testing ordered.  Encouraged patient to call clinic or PCP if any questions or concerns that may naresh

## 2021-12-22 NOTE — TELEPHONE ENCOUNTER
Carter callahan. Faxed chart notes.  Called patient to remind him to get TB test. He will get test next week

## 2022-01-12 ENCOUNTER — LAB ENCOUNTER (OUTPATIENT)
Dept: LAB | Age: 32
End: 2022-01-12
Attending: DERMATOLOGY
Payer: COMMERCIAL

## 2022-01-12 DIAGNOSIS — L40.0 PSORIASIS VULGARIS: ICD-10-CM

## 2022-01-12 DIAGNOSIS — Z51.81 MEDICATION MONITORING ENCOUNTER: ICD-10-CM

## 2022-01-12 PROCEDURE — 86480 TB TEST CELL IMMUN MEASURE: CPT

## 2022-01-12 PROCEDURE — 36415 COLL VENOUS BLD VENIPUNCTURE: CPT

## 2022-01-13 ENCOUNTER — APPOINTMENT (OUTPATIENT)
Dept: HEMATOLOGY/ONCOLOGY | Facility: HOSPITAL | Age: 32
End: 2022-01-13
Payer: COMMERCIAL

## 2022-01-13 ENCOUNTER — APPOINTMENT (OUTPATIENT)
Dept: GENETICS | Facility: HOSPITAL | Age: 32
End: 2022-01-13
Payer: COMMERCIAL

## 2022-01-14 LAB
M TB IFN-G CD4+ T-CELLS BLD-ACNC: 0 IU/ML
M TB TUBERC IFN-G BLD QL: NEGATIVE
M TB TUBERC IGNF/MITOGEN IGNF CONTROL: >10 IU/ML
QFT TB1 AG MINUS NIL: 0.02 IU/ML
QFT TB2 AG MINUS NIL: 0 IU/ML

## 2022-01-15 NOTE — PROGRESS NOTES
Quant gold negative okay to continue process for resuming his Biologics-Skyrizi. Please let patient know. May need to send new rx for skyrizi if he is ready to start.

## 2022-01-17 NOTE — PROGRESS NOTES
Spoke with Maria Antonia Mark at [facility] and she states pt's insurance approved pt's [employer] and will be transferring the rx to [facility].   Maria Antonia Mark states she will inform our office one the transfer is complete and will also reach out to the patient to provide him with an u

## 2022-01-17 NOTE — PROGRESS NOTES
Pt informed of lab results. Lab results faxed to Mirza at [facility]. LMTCB for Mirza to check states of pt's Skyrizi. Pt states his insurance mandates his medication be filled at [facility]. Await cb from Mirza.

## 2022-01-19 RX ORDER — RISANKIZUMAB-RZAA 150 MG/ML
150 INJECTION SUBCUTANEOUS
Qty: 1 ML | Refills: 3 | Status: CANCELLED | OUTPATIENT
Start: 2022-01-19 | End: 2022-02-16

## 2022-01-19 RX ORDER — RISANKIZUMAB-RZAA 150 MG/ML
150 INJECTION SUBCUTANEOUS
Qty: 2 ML | Refills: 0 | Status: CANCELLED | OUTPATIENT
Start: 2022-01-19 | End: 2022-02-16

## 2022-01-19 NOTE — TELEPHONE ENCOUNTER
Fax from [employer] card pending patient contact for [employer] in pa inbox    Fax from [address] in pa inbox    [facility] fax in pa inbox

## 2022-01-19 NOTE — TELEPHONE ENCOUNTER
RXs for skyrizi pended for [facility], Dr. Aj Conklin please review/sign.     LMTCB for pt - please let him know to F/U with [facility]    Approval dates as per [facility] 12/11/21-4/10/22    Authorization # [id number]    Enrollment forms, approval sent to scan

## 2022-01-21 RX ORDER — RISANKIZUMAB-RZAA 150 MG/ML
150 INJECTION SUBCUTANEOUS AS DIRECTED
Qty: 2 ML | Refills: 0 | Status: SHIPPED | OUTPATIENT
Start: 2022-01-21

## 2022-01-21 RX ORDER — RISANKIZUMAB-RZAA 150 MG/ML
150 INJECTION SUBCUTANEOUS
Qty: 1 ML | Refills: 1 | Status: SHIPPED | OUTPATIENT
Start: 2022-01-21

## 2022-02-16 ENCOUNTER — TELEPHONE (OUTPATIENT)
Dept: DERMATOLOGY CLINIC | Facility: CLINIC | Age: 32
End: 2022-02-16

## 2022-02-16 NOTE — TELEPHONE ENCOUNTER
Pt's first skyrizi injection came in - pt requested to have a Friday appt, appt made. Skyrizi placed in fridge, in original package, away from sunlight.

## 2022-02-18 ENCOUNTER — NURSE ONLY (OUTPATIENT)
Dept: DERMATOLOGY CLINIC | Facility: CLINIC | Age: 32
End: 2022-02-18
Payer: COMMERCIAL

## 2022-02-18 DIAGNOSIS — L40.8 OTHER PSORIASIS: Primary | ICD-10-CM

## 2022-03-07 RX ORDER — RISANKIZUMAB-RZAA 150 MG/ML
150 INJECTION SUBCUTANEOUS
Qty: 1 ML | Refills: 1 | Status: CANCELLED | OUTPATIENT
Start: 2022-03-07

## 2022-03-07 RX ORDER — RISANKIZUMAB-RZAA 150 MG/ML
150 INJECTION SUBCUTANEOUS AS DIRECTED
Qty: 2 ML | Refills: 0 | Status: CANCELLED | OUTPATIENT
Start: 2022-03-07

## 2022-03-07 NOTE — TELEPHONE ENCOUNTER
[phone]    Patient called. States was told Skyrizi rx last time was for one dose and no refills.  Please call

## 2022-03-08 NOTE — TELEPHONE ENCOUNTER
Please clarify did patient get his loading dose as he was restarting Skyrizi since both maintenance and loading dose are pended. Was this sent to [address] [place] [address] previously? Or was Rx just transferred by [facility]? 

## 2022-03-08 NOTE — TELEPHONE ENCOUNTER
[employer] msg sent to pt to clarify. He usually communicates via [email or web address].     These were both [facility] to [facility] 1[facility]21/22

## 2022-03-09 NOTE — TELEPHONE ENCOUNTER
Fax from Dominic Hanson scheduled for [facility] for 3/11/22 to office    Placed fax in pa inbox

## 2022-03-11 NOTE — TELEPHONE ENCOUNTER
Pt's Felix received in the office on 3/11/22 from [facility] and placed in the fridge. Pt scheduled for injection on 3/14/22. This is pt's week 4 dose (150mg). Pt's last injection was on 2/18/22. Pt will then begin maintenance dose (150mg every 12 weeks starting on week 16).

## 2022-03-23 ENCOUNTER — NURSE ONLY (OUTPATIENT)
Dept: DERMATOLOGY CLINIC | Facility: CLINIC | Age: 32
End: 2022-03-23
Payer: COMMERCIAL

## 2022-03-23 DIAGNOSIS — L40.8 OTHER PSORIASIS: Primary | ICD-10-CM

## 2022-05-14 ENCOUNTER — PATIENT MESSAGE (OUTPATIENT)
Dept: FAMILY MEDICINE CLINIC | Facility: CLINIC | Age: 32
End: 2022-05-14

## 2022-05-14 NOTE — TELEPHONE ENCOUNTER
From: Charla Franklin  To: Grover Proctor MD  Sent: 5/14/2022 10:50 AM CDT  Subject: Dermatology referral     Good morning,     During my previous doctors appointment, you provided me a referral for dermatology to see Dr. Karen Tran for only 1 visit in which I utilized. Can you please submit another referral for Dr. Karen Tran (dermatology) for more then 1 visit.      Thanks

## 2022-05-24 ENCOUNTER — MED REC SCAN ONLY (OUTPATIENT)
Dept: DERMATOLOGY CLINIC | Facility: CLINIC | Age: 32
End: 2022-05-24

## 2022-05-24 ENCOUNTER — TELEPHONE (OUTPATIENT)
Dept: DERMATOLOGY CLINIC | Facility: CLINIC | Age: 32
End: 2022-05-24

## 2022-05-24 NOTE — TELEPHONE ENCOUNTER
Fax from [address] started for [employer] 150mg    [place]: [address] fax in pa inbox

## 2022-06-29 ENCOUNTER — NURSE ONLY (OUTPATIENT)
Dept: DERMATOLOGY CLINIC | Facility: CLINIC | Age: 32
End: 2022-06-29
Payer: COMMERCIAL

## 2022-06-29 DIAGNOSIS — L40.8 OTHER PSORIASIS: Primary | ICD-10-CM

## 2022-06-29 PROCEDURE — 99211 OFF/OP EST MAY X REQ PHY/QHP: CPT | Performed by: DERMATOLOGY

## 2022-08-23 ENCOUNTER — TELEPHONE (OUTPATIENT)
Dept: DERMATOLOGY CLINIC | Facility: CLINIC | Age: 32
End: 2022-08-23

## 2022-08-23 NOTE — TELEPHONE ENCOUNTER
Received pt's Skyrizi in the office[facility]. Pt's last injection was done in the office on 6/29/22. Pt due for next injection at the end of September. Staff - please schedule pt for his next injection on the nurse's schedule at the end of September when pt returns call. Thank you.

## 2022-10-17 ENCOUNTER — HOSPITAL ENCOUNTER (OUTPATIENT)
Age: 32
Discharge: HOME | End: 2022-10-17
Payer: COMMERCIAL

## 2022-10-17 VITALS
TEMPERATURE: 98 F | SYSTOLIC BLOOD PRESSURE: 126 MMHG | DIASTOLIC BLOOD PRESSURE: 73 MMHG | BODY MASS INDEX: 25.73 KG/M2 | HEART RATE: 80 BPM | HEIGHT: 72 IN | WEIGHT: 190 LBS | RESPIRATION RATE: 18 BRPM | OXYGEN SATURATION: 95 %

## 2022-10-17 DIAGNOSIS — J30.2 SEASONAL ALLERGIES: Primary | ICD-10-CM

## 2022-10-17 PROCEDURE — 99213 OFFICE O/P EST LOW 20 MIN: CPT

## 2022-10-17 NOTE — ED INITIAL ASSESSMENT (HPI)
Pt c/o dry cough x 3 days, states he had multiple negative at home covid test, denies chest pain or SOB

## 2022-10-24 ENCOUNTER — TELEPHONE (OUTPATIENT)
Dept: DERMATOLOGY CLINIC | Facility: CLINIC | Age: 32
End: 2022-10-24

## 2022-10-25 RX ORDER — RISANKIZUMAB-RZAA 150 MG/ML
150 INJECTION SUBCUTANEOUS
Qty: 1 ML | Refills: 0 | Status: SHIPPED | OUTPATIENT
Start: 2022-10-25

## 2022-10-25 NOTE — TELEPHONE ENCOUNTER
Quant gold 1/12/22, ok    Did he do injection in Sept? Had come into office in June. Looks like this is overdue?    lov with me 11/21--is due for appt also with me    Armen Marquis x1 sent  Please have patient schedule appointment for further refills.

## 2022-10-25 NOTE — TELEPHONE ENCOUNTER
S/w pt, he states he was not aware that we called and left msg (he never called us back) I had Larissa Breeding check fridge and skyrizi is still there. Appt made for 10/31 with you and I also put him on RN schedule for skyrizi.

## 2022-10-31 ENCOUNTER — OFFICE VISIT (OUTPATIENT)
Dept: DERMATOLOGY CLINIC | Facility: CLINIC | Age: 32
End: 2022-10-31
Payer: COMMERCIAL

## 2022-10-31 ENCOUNTER — NURSE ONLY (OUTPATIENT)
Dept: DERMATOLOGY CLINIC | Facility: CLINIC | Age: 32
End: 2022-10-31
Payer: COMMERCIAL

## 2022-10-31 DIAGNOSIS — L40.8 OTHER PSORIASIS: ICD-10-CM

## 2022-10-31 DIAGNOSIS — L40.0 PSORIASIS VULGARIS: Primary | ICD-10-CM

## 2022-10-31 DIAGNOSIS — Z51.81 MEDICATION MONITORING ENCOUNTER: ICD-10-CM

## 2022-10-31 PROCEDURE — 99213 OFFICE O/P EST LOW 20 MIN: CPT | Performed by: DERMATOLOGY

## 2022-10-31 RX ORDER — IXEKIZUMAB 80 MG/ML
INJECTION, SOLUTION SUBCUTANEOUS
COMMUNITY

## 2022-10-31 RX ORDER — FLUCONAZOLE 100 MG/1
100 TABLET ORAL DAILY
Qty: 5 TABLET | Refills: 3 | Status: SHIPPED | OUTPATIENT
Start: 2022-10-31

## 2022-10-31 RX ORDER — CLOBETASOL PROPIONATE 0.5 MG/G
OINTMENT TOPICAL
Qty: 60 G | Refills: 1 | Status: SHIPPED | OUTPATIENT
Start: 2022-10-31

## 2022-10-31 NOTE — PROGRESS NOTES
Pt presented for Skyrizi injection. Pen malfunction. Pt did not receive any injection as the push button was jammed and would not administer medication. Dr. Yoselin Lindquist aware. Pt to contact [facility] for new pen sent to office. Dominic Choudhary [phone]  LOT [id number]   EXP Sept 2023.

## 2022-11-02 ENCOUNTER — TELEPHONE (OUTPATIENT)
Dept: DERMATOLOGY CLINIC | Facility: CLINIC | Age: 32
End: 2022-11-02

## 2022-11-02 NOTE — TELEPHONE ENCOUNTER
Contacted [employer] in regards to pen misfire. Completed survey and answered questions regarding pen malfunction.   They will be contacting patient for further prossessing

## 2022-11-02 NOTE — TELEPHONE ENCOUNTER
Call from Dewey Keenan    Asking to speak to RN regarding Humira    Please call [phone] option #1 then Option #1 ref [id number]

## 2022-11-02 NOTE — TELEPHONE ENCOUNTER
I called bryant Albright.  She needs to speak directly to Sofia Farris, \"I need to speak directly to the nurse who administered the skyrizi on 10/31/22\"   Please call  press 8 then 1, thank you

## 2022-11-14 ENCOUNTER — NURSE ONLY (OUTPATIENT)
Dept: DERMATOLOGY CLINIC | Facility: CLINIC | Age: 32
End: 2022-11-14
Payer: COMMERCIAL

## 2022-11-14 DIAGNOSIS — L40.8 OTHER PSORIASIS: Primary | ICD-10-CM

## 2023-01-18 NOTE — TELEPHONE ENCOUNTER
Refill Request for medication(s):     Last Office Visit: 10/31/22    Last Refill: 10/25/22    Pharmacy, Dosage verified:     Condition Update (if applicable): LMTCB    Rx pended and sent to provider for approval, please advise. Thank You!

## 2023-01-19 NOTE — TELEPHONE ENCOUNTER
Dr. Thomas Board - pt returned call to provide an update. Pt advised his Gabinojulee Binder lab is due. Pt states he will get this done tomorrow. Pt states he is experiencing join pain in the hands and knees. Pt states his skin is flaring on the buttocks and scalp. Pt states he feels he had better results on the Enbrel. Thank you.

## 2023-01-22 RX ORDER — RISANKIZUMAB-RZAA 150 MG/ML
150 INJECTION SUBCUTANEOUS
Qty: 1 ML | Refills: 0 | Status: SHIPPED | OUTPATIENT
Start: 2023-01-22

## 2023-01-22 NOTE — TELEPHONE ENCOUNTER
rf x 1 sent he needs quant gold for next refill or to resume Enbrel. We will need an appointment to restart Enbrel.

## 2023-01-23 ENCOUNTER — LAB ENCOUNTER (OUTPATIENT)
Dept: LAB | Age: 33
End: 2023-01-23
Attending: DERMATOLOGY
Payer: COMMERCIAL

## 2023-01-23 DIAGNOSIS — L40.0 PSORIASIS VULGARIS: ICD-10-CM

## 2023-01-23 DIAGNOSIS — L40.8 OTHER PSORIASIS: ICD-10-CM

## 2023-01-23 DIAGNOSIS — Z51.81 MEDICATION MONITORING ENCOUNTER: ICD-10-CM

## 2023-01-23 PROCEDURE — 86480 TB TEST CELL IMMUN MEASURE: CPT

## 2023-01-23 PROCEDURE — 36415 COLL VENOUS BLD VENIPUNCTURE: CPT

## 2023-01-23 NOTE — TELEPHONE ENCOUNTER
Pt returned call to state that he had is Carlton Solis lab work done today. Pt scheduled for appt on 1/27/23 to discuss options for biologics.

## 2023-01-25 LAB
M TB IFN-G CD4+ T-CELLS BLD-ACNC: 0.05 IU/ML
M TB TUBERC IFN-G BLD QL: NEGATIVE
M TB TUBERC IGNF/MITOGEN IGNF CONTROL: >10 IU/ML
QFT TB1 AG MINUS NIL: 0 IU/ML
QFT TB2 AG MINUS NIL: 0 IU/ML

## 2023-01-27 ENCOUNTER — TELEPHONE (OUTPATIENT)
Dept: DERMATOLOGY CLINIC | Facility: CLINIC | Age: 33
End: 2023-01-27

## 2023-01-27 ENCOUNTER — OFFICE VISIT (OUTPATIENT)
Dept: DERMATOLOGY CLINIC | Facility: CLINIC | Age: 33
End: 2023-01-27

## 2023-01-27 DIAGNOSIS — L40.0 PSORIASIS VULGARIS: ICD-10-CM

## 2023-01-27 DIAGNOSIS — L40.50 PSORIATIC ARTHRITIS OF MULTIPLE JOINTS (HCC): ICD-10-CM

## 2023-01-27 DIAGNOSIS — Z51.81 MEDICATION MONITORING ENCOUNTER: ICD-10-CM

## 2023-01-27 DIAGNOSIS — L40.8 OTHER PSORIASIS: Primary | ICD-10-CM

## 2023-01-27 PROCEDURE — 99214 OFFICE O/P EST MOD 30 MIN: CPT | Performed by: DERMATOLOGY

## 2023-01-27 RX ORDER — ETANERCEPT 50 MG/ML
50 SOLUTION SUBCUTANEOUS WEEKLY
Qty: 4 EACH | Refills: 3 | Status: SHIPPED | OUTPATIENT
Start: 2023-01-27

## 2023-01-27 RX ORDER — ETANERCEPT 50 MG/ML
50 SOLUTION SUBCUTANEOUS
Qty: 8 EACH | Refills: 2 | Status: SHIPPED | OUTPATIENT
Start: 2023-01-27

## 2023-01-27 NOTE — TELEPHONE ENCOUNTER
Pt to start Enbrel. Forms [facility] by pt [facility] and [facility]. Clinical staff - please fax enrollment forms directly to Mirza at [place].

## 2023-01-30 NOTE — TELEPHONE ENCOUNTER
Pt's Enbrel enrollment form faxed to [facility] along with pt's chart notes and Mick Shoemaker lab results. Awaiting determination.

## 2023-02-08 NOTE — TELEPHONE ENCOUNTER
S/w Angela at [facility] - they are unable to obtain PA forms for enbrel - it needs to go [facility] - I reached out to [facility] and was able to request PA forms be faxed to office

## 2023-02-14 NOTE — TELEPHONE ENCOUNTER
PA form signed by [facility] and faxed to [employer] on 2/14/23. Awaiting determination.

## 2023-04-04 ENCOUNTER — TELEPHONE (OUTPATIENT)
Dept: DERMATOLOGY CLINIC | Facility: CLINIC | Age: 33
End: 2023-04-04

## 2023-04-18 NOTE — TELEPHONE ENCOUNTER
Dr. Rosalia Galvan,    Pt is seeking intermittent FMLA for his psoriatic arthritis. He is requesting 3-4 appts per year, each appt lasting 1-4 hours. He is also seeking 1-2 flare ups a month, lasting 1-48 hours per episode. Do you support? Thank you,    Rickey Staley.     FMLA start date: 5/1/23

## 2023-04-19 NOTE — TELEPHONE ENCOUNTER
Dr. Idris Benson,      Please sign off on form if you agree to: FMLA for psoriatic arthritis, 1-2 flare ups a month, lasting 1-48 hours per episode & 3-4 appts per year, each appt lasting 1-4 hours   (Please place your signature on the first page only)    -Within your inbasket, Highlight the patient and hit \"Chart\" button. -In Chart Review, w/in the Encounter tab - click 1 time on the Telephone call encounter for 4/4/23. Scroll down the telephone encounter.  -Click \"scan on\" blue Hyperlink under \"Media\" heading for FMLA Dr. Idris Benson 4/19/23 w/in the telephone enc.  -Click on Acknowledge button at the bottom right corner and left-click onto image, signature stamp appears and drag signature to Provider signature line. Stamp will turn blue. Close window. Thank you,    Harlan Arthur.

## 2023-04-20 NOTE — TELEPHONE ENCOUNTER
FMLA forms completed & faxed to [address] [place] [address] at #[phone]. Fax confirmation received. [place] message sent to pt.

## 2023-05-19 ENCOUNTER — TELEPHONE (OUTPATIENT)
Dept: DERMATOLOGY CLINIC | Facility: CLINIC | Age: 33
End: 2023-05-19

## 2023-07-07 NOTE — TELEPHONE ENCOUNTER
Dr. Quentin Martin, please approve referral. Pt has scheduled appt tomorrow with Dr. Bishnu Louie. Thanks.
Referral on chart. Call patient.
9

## 2023-09-29 ENCOUNTER — TELEPHONE (OUTPATIENT)
Dept: DERMATOLOGY CLINIC | Facility: CLINIC | Age: 33
End: 2023-09-29

## 2023-09-29 NOTE — TELEPHONE ENCOUNTER
Fax from [address] required for Enbrel    KEY: [facility]    Placed fax in pa inbox

## 2023-10-31 ENCOUNTER — OFFICE VISIT (OUTPATIENT)
Dept: FAMILY MEDICINE CLINIC | Facility: CLINIC | Age: 33
End: 2023-10-31

## 2023-10-31 VITALS
DIASTOLIC BLOOD PRESSURE: 79 MMHG | WEIGHT: 190.38 LBS | SYSTOLIC BLOOD PRESSURE: 121 MMHG | HEART RATE: 67 BPM | BODY MASS INDEX: 26 KG/M2

## 2023-10-31 DIAGNOSIS — R07.9 CHEST PAIN, UNSPECIFIED TYPE: Primary | ICD-10-CM

## 2023-10-31 DIAGNOSIS — L40.9 PSORIASIS: ICD-10-CM

## 2023-10-31 DIAGNOSIS — F17.210 CIGARETTE NICOTINE DEPENDENCE WITHOUT COMPLICATION: ICD-10-CM

## 2023-10-31 LAB
ATRIAL RATE: 63 BPM
P AXIS: 2 DEGREES
P-R INTERVAL: 160 MS
Q-T INTERVAL: 406 MS
QRS DURATION: 80 MS
QTC CALCULATION (BEZET): 415 MS
R AXIS: 14 DEGREES
T AXIS: 23 DEGREES
VENTRICULAR RATE: 63 BPM

## 2023-10-31 PROCEDURE — 3074F SYST BP LT 130 MM HG: CPT | Performed by: FAMILY MEDICINE

## 2023-10-31 PROCEDURE — 3078F DIAST BP <80 MM HG: CPT | Performed by: FAMILY MEDICINE

## 2023-10-31 PROCEDURE — 99214 OFFICE O/P EST MOD 30 MIN: CPT | Performed by: FAMILY MEDICINE

## 2023-10-31 PROCEDURE — 93000 ELECTROCARDIOGRAM COMPLETE: CPT | Performed by: FAMILY MEDICINE

## 2023-10-31 RX ORDER — HYDROCORTISONE 25 MG/G
1 CREAM TOPICAL 2 TIMES DAILY
Qty: 28 G | Refills: 1 | Status: SHIPPED | OUTPATIENT
Start: 2023-10-31

## 2023-10-31 RX ORDER — CLOBETASOL PROPIONATE 0.5 MG/G
OINTMENT TOPICAL
Qty: 60 G | Refills: 1 | Status: SHIPPED | OUTPATIENT
Start: 2023-10-31

## 2023-10-31 RX ORDER — CLARITHROMYCIN 500 MG/1
500 TABLET, COATED ORAL 2 TIMES DAILY
Qty: 20 TABLET | Refills: 0 | Status: SHIPPED | OUTPATIENT
Start: 2023-10-31

## 2023-12-22 ENCOUNTER — OFFICE VISIT (OUTPATIENT)
Dept: FAMILY MEDICINE CLINIC | Facility: CLINIC | Age: 33
End: 2023-12-22
Payer: COMMERCIAL

## 2023-12-22 VITALS
BODY MASS INDEX: 25.28 KG/M2 | SYSTOLIC BLOOD PRESSURE: 121 MMHG | HEART RATE: 116 BPM | WEIGHT: 186.63 LBS | RESPIRATION RATE: 20 BRPM | DIASTOLIC BLOOD PRESSURE: 83 MMHG | OXYGEN SATURATION: 97 % | HEIGHT: 72 IN

## 2023-12-22 DIAGNOSIS — L40.9 PSORIASIS: Primary | ICD-10-CM

## 2023-12-22 DIAGNOSIS — Z00.00 ANNUAL PHYSICAL EXAM: ICD-10-CM

## 2023-12-22 DIAGNOSIS — M25.562 LEFT KNEE PAIN, UNSPECIFIED CHRONICITY: ICD-10-CM

## 2023-12-22 NOTE — PROGRESS NOTES
Subjective:   Patient ID: Popeye Pond is a 35year old male. HPI  Here for annual physical   Feeling well   Does have a problem with the left knee -pain no instability   Works as a    History/Other:   Review of Systems    Constitutional: Negative. Negative for activity change, appetite change, diaphoresis and fatigue. Respiratory: Negative. Negative for apnea, cough, chest tightness and shortness of breath. Cardiovascular: Negative. Negative for chest pain, palpitations and leg swelling. Gastrointestinal: Negative. Negative for abdominal pain. Skin: Negative. MSJK see hpi   Psychiatric/Behavioral: Negative. Current Outpatient Medications   Medication Sig Dispense Refill    clobetasol 0.05 % External Ointment Use bid to psoriasis 60 g 1    hydrocortisone (ANUSOL-HC) 2.5 % External Cream Place 1 Application rectally 2 (two) times daily. 28 g 1    Etanercept (ENBREL MINI) 50 MG/ML Subcutaneous Solution Cartridge Inject 1 Cartridge (50 mg total) into the skin twice a week. (Cartridges must be injected with an AutoTouch device) 8 each 2    clarithromycin 500 MG Oral Tab Take 1 tablet (500 mg total) by mouth 2 (two) times daily. (Patient not taking: Reported on 12/22/2023) 20 tablet 0    Etanercept (ENBREL MINI) 50 MG/ML Subcutaneous Solution Cartridge Inject 1 Cartridge (50 mg total) into the skin once a week. (Cartridges must be injected with an AutoTouch device) (Patient not taking: Reported on 12/22/2023) 4 each 3    Risankizumab-rzaa (SKYRIZI PEN) 150 MG/ML Subcutaneous Solution Auto-injector Inject 150 mg into the skin every 3 (three) months. (Patient not taking: Reported on 12/22/2023) 1 mL 0    Ixekizumab (TALTZ) 80 MG/ML Subcutaneous Solution Auto-injector  (Patient not taking: Reported on 10/31/2022)      fluconazole 100 MG Oral Tab Take 1 tablet (100 mg total) by mouth in the morning. 1 po qd.  (Patient not taking: Reported on 1/27/2023) 5 tablet 3    mupirocin 2 % External Ointment Use as directed bid to affected area (Patient not taking: Reported on 12/22/2023) 22 g 3     Allergies: Allergies   Allergen Reactions    Augmentin [Amoxicillin-Pot Clavulanate] NAUSEA AND VOMITING    Seasonal        Objective:   Physical Exam  Constitutional:       Appearance: He is well-developed. Cardiovascular:      Rate and Rhythm: Normal rate and regular rhythm. Heart sounds: Normal heart sounds. Pulmonary:      Effort: Pulmonary effort is normal.      Breath sounds: Normal breath sounds. Abdominal:      General: Bowel sounds are normal.      Palpations: Abdomen is soft. Musculoskeletal:         General: Tenderness present. Neurological:      Mental Status: He is alert. Deep Tendon Reflexes: Reflexes are normal and symmetric. Skin psoriaris    Assessment & Plan:   1. Psoriasis    2. Left knee pain, unspecified chronicity    3.  Annual physical exam    Start physical therapy     Orders Placed This Encounter   Procedures    Comp Metabolic Panel (14)    Lipid Panel    CBC With Differential With Platelet       Meds This Visit:  Requested Prescriptions      No prescriptions requested or ordered in this encounter       Imaging & Referrals:  DERM - INTERNAL  PHYSICAL THERAPY - INTERNAL

## 2024-01-03 ENCOUNTER — PATIENT MESSAGE (OUTPATIENT)
Dept: DERMATOLOGY CLINIC | Facility: CLINIC | Age: 34
End: 2024-01-03

## 2024-01-03 DIAGNOSIS — L40.9 PSORIASIS: ICD-10-CM

## 2024-01-03 DIAGNOSIS — Z51.81 MEDICATION MONITORING ENCOUNTER: Primary | ICD-10-CM

## 2024-01-08 ENCOUNTER — OFFICE VISIT (OUTPATIENT)
Dept: DERMATOLOGY CLINIC | Facility: CLINIC | Age: 34
End: 2024-01-08

## 2024-01-08 ENCOUNTER — LAB ENCOUNTER (OUTPATIENT)
Dept: LAB | Age: 34
End: 2024-01-08
Attending: DERMATOLOGY
Payer: COMMERCIAL

## 2024-01-08 ENCOUNTER — TELEPHONE (OUTPATIENT)
Dept: DERMATOLOGY CLINIC | Facility: CLINIC | Age: 34
End: 2024-01-08

## 2024-01-08 DIAGNOSIS — L40.8 OTHER PSORIASIS: ICD-10-CM

## 2024-01-08 DIAGNOSIS — Z51.81 MEDICATION MONITORING ENCOUNTER: ICD-10-CM

## 2024-01-08 DIAGNOSIS — L40.50 PSORIATIC ARTHRITIS OF MULTIPLE JOINTS (HCC): ICD-10-CM

## 2024-01-08 DIAGNOSIS — L40.0 PSORIASIS VULGARIS: ICD-10-CM

## 2024-01-08 DIAGNOSIS — L40.9 PSORIASIS: ICD-10-CM

## 2024-01-08 DIAGNOSIS — Z51.81 MEDICATION MONITORING ENCOUNTER: Primary | ICD-10-CM

## 2024-01-08 DIAGNOSIS — Z00.00 ANNUAL PHYSICAL EXAM: ICD-10-CM

## 2024-01-08 LAB
ALBUMIN SERPL-MCNC: 4.3 G/DL (ref 3.2–4.8)
ALBUMIN/GLOB SERPL: 1.4 {RATIO} (ref 1–2)
ALP LIVER SERPL-CCNC: 62 U/L
ALT SERPL-CCNC: 20 U/L
ANION GAP SERPL CALC-SCNC: 5 MMOL/L (ref 0–18)
AST SERPL-CCNC: 18 U/L (ref ?–34)
BASOPHILS # BLD AUTO: 0.03 X10(3) UL (ref 0–0.2)
BASOPHILS NFR BLD AUTO: 0.4 %
BILIRUB SERPL-MCNC: 0.6 MG/DL (ref 0.3–1.2)
BUN BLD-MCNC: 10 MG/DL (ref 9–23)
BUN/CREAT SERPL: 7.9 (ref 10–20)
CALCIUM BLD-MCNC: 9.5 MG/DL (ref 8.7–10.4)
CHLORIDE SERPL-SCNC: 106 MMOL/L (ref 98–112)
CHOLEST SERPL-MCNC: 170 MG/DL (ref ?–200)
CO2 SERPL-SCNC: 28 MMOL/L (ref 21–32)
CREAT BLD-MCNC: 1.27 MG/DL
DEPRECATED RDW RBC AUTO: 39.8 FL (ref 35.1–46.3)
EGFRCR SERPLBLD CKD-EPI 2021: 77 ML/MIN/1.73M2 (ref 60–?)
EOSINOPHIL # BLD AUTO: 0.1 X10(3) UL (ref 0–0.7)
EOSINOPHIL NFR BLD AUTO: 1.4 %
ERYTHROCYTE [DISTWIDTH] IN BLOOD BY AUTOMATED COUNT: 11.9 % (ref 11–15)
FASTING PATIENT LIPID ANSWER: YES
FASTING STATUS PATIENT QL REPORTED: YES
GLOBULIN PLAS-MCNC: 3.1 G/DL (ref 2.8–4.4)
GLUCOSE BLD-MCNC: 89 MG/DL (ref 70–99)
HCT VFR BLD AUTO: 42.4 %
HDLC SERPL-MCNC: 62 MG/DL (ref 40–59)
HGB BLD-MCNC: 14.4 G/DL
IMM GRANULOCYTES # BLD AUTO: 0.01 X10(3) UL (ref 0–1)
IMM GRANULOCYTES NFR BLD: 0.1 %
LDLC SERPL CALC-MCNC: 96 MG/DL (ref ?–100)
LYMPHOCYTES # BLD AUTO: 3.24 X10(3) UL (ref 1–4)
LYMPHOCYTES NFR BLD AUTO: 45.8 %
MCH RBC QN AUTO: 31.2 PG (ref 26–34)
MCHC RBC AUTO-ENTMCNC: 34 G/DL (ref 31–37)
MCV RBC AUTO: 91.8 FL
MONOCYTES # BLD AUTO: 0.62 X10(3) UL (ref 0.1–1)
MONOCYTES NFR BLD AUTO: 8.8 %
NEUTROPHILS # BLD AUTO: 3.07 X10 (3) UL (ref 1.5–7.7)
NEUTROPHILS # BLD AUTO: 3.07 X10(3) UL (ref 1.5–7.7)
NEUTROPHILS NFR BLD AUTO: 43.5 %
NONHDLC SERPL-MCNC: 108 MG/DL (ref ?–130)
OSMOLALITY SERPL CALC.SUM OF ELEC: 287 MOSM/KG (ref 275–295)
PLATELET # BLD AUTO: 195 10(3)UL (ref 150–450)
POTASSIUM SERPL-SCNC: 4 MMOL/L (ref 3.5–5.1)
PROT SERPL-MCNC: 7.4 G/DL (ref 5.7–8.2)
RBC # BLD AUTO: 4.62 X10(6)UL
SODIUM SERPL-SCNC: 139 MMOL/L (ref 136–145)
TRIGL SERPL-MCNC: 59 MG/DL (ref 30–149)
VLDLC SERPL CALC-MCNC: 10 MG/DL (ref 0–30)
WBC # BLD AUTO: 7.1 X10(3) UL (ref 4–11)

## 2024-01-08 PROCEDURE — 36415 COLL VENOUS BLD VENIPUNCTURE: CPT

## 2024-01-08 PROCEDURE — 99215 OFFICE O/P EST HI 40 MIN: CPT | Performed by: DERMATOLOGY

## 2024-01-08 PROCEDURE — 86480 TB TEST CELL IMMUN MEASURE: CPT

## 2024-01-08 PROCEDURE — 80061 LIPID PANEL: CPT

## 2024-01-08 PROCEDURE — 80053 COMPREHEN METABOLIC PANEL: CPT

## 2024-01-08 PROCEDURE — 85025 COMPLETE CBC W/AUTO DIFF WBC: CPT

## 2024-01-08 RX ORDER — CICLOPIROX 7.7 MG/G
GEL TOPICAL
Qty: 100 G | Refills: 2 | Status: SHIPPED | OUTPATIENT
Start: 2024-01-08

## 2024-01-08 RX ORDER — RISANKIZUMAB-RZAA 150 MG/ML
150 INJECTION SUBCUTANEOUS AS DIRECTED
Qty: 2 ML | Refills: 0 | Status: SHIPPED | OUTPATIENT
Start: 2024-01-08

## 2024-01-08 RX ORDER — RISANKIZUMAB-RZAA 150 MG/ML
150 INJECTION SUBCUTANEOUS
Qty: 1 ML | Refills: 1 | Status: SHIPPED | OUTPATIENT
Start: 2024-01-08

## 2024-01-08 NOTE — TELEPHONE ENCOUNTER
Felix enrollment forms completed and with [facility]. Pt has lab orders for Qgold TB test which they will complete soon.

## 2024-01-11 LAB
M TB IFN-G CD4+ T-CELLS BLD-ACNC: 0 IU/ML
M TB TUBERC IFN-G BLD QL: NEGATIVE
M TB TUBERC IGNF/MITOGEN IGNF CONTROL: >10 IU/ML
QFT TB1 AG MINUS NIL: 0.03 IU/ML
QFT TB2 AG MINUS NIL: 0.11 IU/ML

## 2024-01-13 NOTE — PROGRESS NOTES
QuantiFERON gold is negative patient may continue with their biologic medication.  Please proceed with authorizations as necessary. Please fax forms for Felix, please proceed with PA see office visit

## 2024-01-14 NOTE — PROGRESS NOTES
Jansuz Peace is a 33 year old male.    Chief Complaint   Patient presents with    Psoriasis     LOV 01/27/23. Pt presents for Psoriasis F/U. Psoriasis bothering head, groin. Buttock(possibly yeast), hands and feet. Bleeding, itching and painful.  Enbrel isn't working well so Pt wants to go back on Skyrizi. Pt will get Qgold test done soon.              Augmentin [amoxicillin-pot clavulanate] and Seasonal  Current Outpatient Medications   Medication Sig Dispense Refill    Risankizumab-rzaa (SKYRIZI PEN) 150 MG/ML Subcutaneous Solution Auto-injector Inject 150 mg into the skin As Directed. Induction dose 150mg at week 0,4 then begin maintenance dose at week 16 2 mL 0    Risankizumab-rzaa (SKYRIZI PEN) 150 MG/ML Subcutaneous Solution Auto-injector Inject 150 mg into the skin every 3 (three) months. 1 mL 1    hydrocortisone 2.5 % External Cream Apply 1 Application topically 2 (two) times daily. Apply every morning and evening. 454 g 1    hydrocortisone 2.5 % External Cream Apply 1 Application topically 2 (two) times daily. Apply tid while at work as needed 90 g 2    Ciclopirox 0.77 % External Gel Apply qam as directed to  g 2    Ciclopirox Olamine 0.77 % External Cream Apply at bedtime to AA 90 g 2    clobetasol 0.05 % External Ointment Use bid to psoriasis 60 g 1    hydrocortisone (ANUSOL-HC) 2.5 % External Cream Place 1 Application rectally 2 (two) times daily. 28 g 1    clarithromycin 500 MG Oral Tab Take 1 tablet (500 mg total) by mouth 2 (two) times daily. (Patient not taking: Reported on 12/22/2023) 20 tablet 0    fluconazole 100 MG Oral Tab Take 1 tablet (100 mg total) by mouth in the morning. 1 po qd. (Patient not taking: Reported on 1/27/2023) 5 tablet 3    mupirocin 2 % External Ointment Use as directed bid to affected area (Patient not taking: Reported on 12/22/2023) 22 g 3      Past Medical History:   Diagnosis Date    Chronic fatigue syndrome     Psoriasis     Thyroid disease       Social  History:  Social History     Socioeconomic History    Marital status:     Number of children: 1   Occupational History    Occupation: [employer]   Tobacco Use    Smoking status: Some Days     Packs/day: 0.25     Years: 9.00     Additional pack years: 0.00     Total pack years: 2.25     Types: Cigarettes    Smokeless tobacco: Never   Vaping Use    Vaping Use: Never used   Substance and Sexual Activity    Alcohol use: Yes     Comment: social    Drug use: No   Other Topics Concern    Grew up on a farm No    History of tanning No    Outdoor occupation No    Pt has a pacemaker No    Pt has a defibrillator No    Reaction to local anesthetic No                 Current Outpatient Medications   Medication Sig Dispense Refill    Risankizumab-rzaa (SKYRIZI PEN) 150 MG/ML Subcutaneous Solution Auto-injector Inject 150 mg into the skin As Directed. Induction dose 150mg at week 0,4 then begin maintenance dose at week 16 2 mL 0    Risankizumab-rzaa (SKYRIZI PEN) 150 MG/ML Subcutaneous Solution Auto-injector Inject 150 mg into the skin every 3 (three) months. 1 mL 1    hydrocortisone 2.5 % External Cream Apply 1 Application topically 2 (two) times daily. Apply every morning and evening. 454 g 1    hydrocortisone 2.5 % External Cream Apply 1 Application topically 2 (two) times daily. Apply tid while at work as needed 90 g 2    Ciclopirox 0.77 % External Gel Apply qam as directed to  g 2    Ciclopirox Olamine 0.77 % External Cream Apply at bedtime to AA 90 g 2    clobetasol 0.05 % External Ointment Use bid to psoriasis 60 g 1    hydrocortisone (ANUSOL-HC) 2.5 % External Cream Place 1 Application rectally 2 (two) times daily. 28 g 1    clarithromycin 500 MG Oral Tab Take 1 tablet (500 mg total) by mouth 2 (two) times daily. (Patient not taking: Reported on 12/22/2023) 20 tablet 0    fluconazole 100 MG Oral Tab Take 1 tablet (100 mg total) by mouth in the morning. 1 po qd. (Patient not taking: Reported on 1/27/2023) 5  tablet 3    mupirocin 2 % External Ointment Use as directed bid to affected area (Patient not taking: Reported on 12/22/2023) 22 g 3     Allergies:   Allergies   Allergen Reactions    Augmentin [Amoxicillin-Pot Clavulanate] NAUSEA AND VOMITING    Seasonal        Past Medical History:   Diagnosis Date    Chronic fatigue syndrome     Psoriasis     Thyroid disease      Past Surgical History:   Procedure Laterality Date    RECONSTR MANDIBLE,BONE PLATE Right 2008     Social History     Socioeconomic History    Marital status:      Spouse name: Not on file    Number of children: 1    Years of education: Not on file    Highest education level: Not on file   Occupational History    Occupation: [employer]   Tobacco Use    Smoking status: Some Days     Packs/day: 0.25     Years: 9.00     Additional pack years: 0.00     Total pack years: 2.25     Types: Cigarettes    Smokeless tobacco: Never   Vaping Use    Vaping Use: Never used   Substance and Sexual Activity    Alcohol use: Yes     Comment: social    Drug use: No    Sexual activity: Not on file   Other Topics Concern    Grew up on a farm No    History of tanning No    Outdoor occupation No    Pt has a pacemaker No    Pt has a defibrillator No    Reaction to local anesthetic No    Caffeine Concern Not Asked    Exercise Not Asked    Seat Belt Not Asked    Special Diet Not Asked    Stress Concern Not Asked    Weight Concern Not Asked   Social History Narrative    Not on file     Social Determinants of Health     Financial Resource Strain: Not on file   Food Insecurity: Not on file   Transportation Needs: Not on file   Physical Activity: Not on file   Stress: Not on file   Social Connections: Not on file   Housing Stability: Not on file     Family History   Problem Relation Age of Onset    Cancer Mother 40        breast cancer    Hypertension Mother     Asthma Mother     Breast Cancer Mother     Prostate Cancer Paternal Grandfather     Asthma Cousin     Asthma  Paternal Uncle                       HPI :      Chief Complaint   Patient presents with    Psoriasis     LOV 01/27/23. Pt presents for Psoriasis F/U. Psoriasis bothering head, groin. Buttock(possibly yeast), hands and feet. Bleeding, itching and painful.  Enbrel isn't working well so Pt wants to go back on Skyrizi. Pt will get Qgold test done soon.      Follow-up psoriasis psoriatic arthritis.   Skin flaring.  Joints have been more inflamed with the cold weather.  Has noted more psoriasis on the face at the hairline and groin genitals buttocks area still flaring  Currently on Enbrel.  Finds it difficult to fit injections into his schedule weekly.  Skin has been flaring.  Aware to do QuantiFERON gold.  Has not noted as much improvement currently as when he had been on Enbrel consistently in the past.  Again more difficult to take consistently and has had flareups in his skin and joints due to delays in injections because of his work schedule.  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  Work schedule is unpredictable.   Previously before resuming Enbrel was on Skyrizi however joints been more inflamed especially with cold weather has had severe knee foot swelling back pain has not been able to move.  Felt this was related to inconsistency in injections.  Every 3 month dosing easier for him.  So far pustular lesions have not recurred.  Had not done well with Humira no response in skin or joints.  History of pustular psoriasis severe over feet, inverse psoriasis over genitals buttocks groin, scalp.  Minimal over buttocks area flares intermittently.  Overall doing well topicals and Skyrizi is for his skin again arthritis flaring.    Patient presents with concerns above.  Personal history of skin cancer-no  Family history of skin cancer-no    Past notes/ records and appropriate/relevant lab results including pathology and past body maps reviewed. Updated and new information noted in current visit.       ROS:     Denies any other systemic complaints.  No fevers, chills, night sweats, sensitivity to the sun, deeper lumps or bumps.  No other skin complaints.  History, medications, allergies as noted.    Physical examination: Patient  well-developed well-nourished, alert oriented in no acute distress.  Exam of involved, appropriate areas of skin performed, including scalp, head, neck, face,nails, hair, external eyes, including conjunctival mucosa, eyelids, lips, external ears, back, chest, abdomen, arms, legs, palms.  Remarkable for lesions as noted   Scalp with more diffuse involvement more prominent plaques along the anterior hairline forehead temples ears patchy areas over the cheeks around the alar crease brows, buttocks groin, minimal over the legs feet scattered patches.  Nails okay minimal joint swelling knees ankles hands     ASSESSMENT AND PLAN:     Encounter Diagnoses   Name Primary?    Medication monitoring encounter Yes    Psoriasis vulgaris     Other psoriasis     Psoriatic arthritis of multiple joints (HCC)        Assessment / plan:    Psoriasis psoriatic arthritis.  History of plantar pustular psoriasis.  Plantar psoriasis was debilitating.  Arthritis continues to flare intermittently    Options discussed with patient.  He would like to resume Skyrizi felt that this regimen was fairly easy.  Did not have any other side effects.  With consistent injections had been reasonably well-controlled.  Arthritis currently manageable.  Consider additional agents to manage psoriasis such as sulfasalazine if needed    Consider possible additional anti-inflammatory such as low-dose methotrexate or Plaquenil.  If arthritis worsening may need to follow-up with rheumatology.  Monitor carefully.  Consider options such as Tremfya or newer agents.  Therapies requiring him to come into the office will not be reasonable options and more frequent laboratory evaluation may be difficult as well  topicals as noted  Quant gold done  negative resulted 1/8/2024 CBC unremarkable lipids HDL 62, CMP unremarkable renal and liver function stable  Handicap parking voucher  Has had this previously    FMLA related to his psoriatic arthritis forms are in place already    Continue topicals     Psoriasis.  Presently 10-% body surface involvement.  Scalp, plaque, inverse, plantar along with psoriatic arthritis involving multiple joints medically necessary to remain on biologic agent.  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  He is found this more manageable with less frequent dosing.  Medically necessary to resume Skyrizi due to dosing schedule  .  Will treat with medications and grid.  Overall skincare, liberal use of emollients discussed.  Consider more aggressive therapy if worsening.Patient will let us know how they are doing over the next several weeks.  Await clinical response to above therapy.  Recheck in 2-3 months if no improvement.    Pathophysiology discussed with patient.  Therapeutic options reviewed.  See  Medications in grid.  Instructions reviewed at length.     General skin care questions answered.   Reassurance regarding benign skin lesions.Signs and symptoms of skin cancer, ABCDE's of melanoma briefly reviewed.  Sunscreen use, sun protection, encouraged.  Followup as noted in rtc or p.r.n.    Encounter Times new patient  Including precharting, reviewing chart, prior notes obtaining history: 10 minutes, medical exam :10 minutes, notes on body map, plan, counseling 20minutes My total time spent caring for the patient on the day of the encounter: 40 minutes     The patient indicates understanding of these issues and agrees to the plan.  The patient is asked to return as noted in follow-up /as noted above    This note was generated using Dragon voice recognition software.  Please contact me regarding any confusion resulting from errors in recognition.    No orders of the defined types were placed  in this encounter.      Meds & Refills for this Visit:   Requested Prescriptions     Signed Prescriptions Disp Refills    Risankizumab-rzaa (SKYRIZI PEN) 150 MG/ML Subcutaneous Solution Auto-injector 2 mL 0     Sig: Inject 150 mg into the skin As Directed. Induction dose 150mg at week 0,4 then begin maintenance dose at week 16    Risankizumab-rzaa (SKYRIZI PEN) 150 MG/ML Subcutaneous Solution Auto-injector 1 mL 1     Sig: Inject 150 mg into the skin every 3 (three) months.    hydrocortisone 2.5 % External Cream 454 g 1     Sig: Apply 1 Application topically 2 (two) times daily. Apply every morning and evening.    hydrocortisone 2.5 % External Cream 90 g 2     Sig: Apply 1 Application topically 2 (two) times daily. Apply tid while at work as needed    Ciclopirox 0.77 % External Gel 100 g 2     Sig: Apply qam as directed to AA    Ciclopirox Olamine 0.77 % External Cream 90 g 2     Sig: Apply at bedtime to AA       Other psoriasis  (primary encounter diagnosis)  Psoriasis vulgaris  Psoriatic arthritis of multiple joints (hcc)  Medication monitoring encounter    No orders of the defined types were placed in this encounter.      Results From Past 48 Hours:  No results found for this or any previous visit (from the past 48 hour(s)).    Meds This Visit:      Imaging Orders:  None     Referral Orders:  No orders of the defined types were placed in this encounter.

## 2024-01-15 ENCOUNTER — TELEPHONE (OUTPATIENT)
Dept: DERMATOLOGY CLINIC | Facility: CLINIC | Age: 34
End: 2024-01-15

## 2024-01-15 NOTE — TELEPHONE ENCOUNTER
[facility] enrollment forms and chart notes faxed to both [facility].

## 2024-01-15 NOTE — PROGRESS NOTES
Pt informed of lab results via [employer].  Skyrizi forms faxed to both Vanessa silvestre [facility] and [email or web address].

## 2024-01-15 NOTE — TELEPHONE ENCOUNTER
Fax from [facility] stating they were not able to identify patient as a participant. Fax placed in inbox.

## 2024-01-16 NOTE — TELEPHONE ENCOUNTER
Fax from [facility]    Referral receipt confirmation for Felix    Placed in pa inbox

## 2024-01-27 ENCOUNTER — TELEPHONE (OUTPATIENT)
Dept: ADMINISTRATIVE | Age: 34
End: 2024-01-27

## 2024-01-27 NOTE — TELEPHONE ENCOUNTER
[place] forms rcvd from [facility] for Dr Nely Weeks in the Forms Dept. Valid PAU under media dated 4/19/23. Forms logged for processing.

## 2024-01-29 ENCOUNTER — TELEPHONE (OUTPATIENT)
Dept: DERMATOLOGY CLINIC | Facility: CLINIC | Age: 34
End: 2024-01-29

## 2024-01-29 NOTE — TELEPHONE ENCOUNTER
E-mail sent to Vanessa silvestre [facility] asking if this rx has been transferred to [email or web address].

## 2024-01-29 NOTE — TELEPHONE ENCOUNTER
Fax from [facility] requesting new rx for     Risankizumab-rzaa (SKYRIZI PEN) 150 MG/ML Subcutaneous Solution Auto-injector, Inject 150 mg into the skin As Directed. Induction dose 150mg at week 0,4 then begin maintenance dose at week 16, Disp: 2 mL, Rfl: 0    Placed in inbox.

## 2024-02-05 NOTE — TELEPHONE ENCOUNTER
Dr. Weeks,     *The ACKNOWLEDGE button has been moved to the top right ribbon*    Please sign off on form if you agree to: Jessica recert 1-2 flare ups per month lasting 1-2 days, 3-4 appts a year 1-4 hrs per appt  (place your signature on the first page only)    -From your Inbasket, Highlight the patient and click Chart   -Double click the 1/27/24 Forms Completion telephone encounter  -Scroll down to the Media section   -Click the blue Hyperlink: Jessica Weeks 2/5/24  -Click Acknowledge located in the top right ribbon/menu   -Drag the mouse into the blank space of the document and a + sign will appear. Left click to   electronically sign the document.     Thank you,    Daja VELASCO

## 2024-02-12 NOTE — TELEPHONE ENCOUNTER
Rx clarification request given to KMT for review/signature.  Staff - please fax to [facility] at [phone].  Thank you.

## 2024-02-13 NOTE — TELEPHONE ENCOUNTER
[facility] faxed back pt support form pa was in process with Carter, please confirm pa has been approved and rx transferred to [facility], then will send erx, is this for a quick start?

## 2024-02-22 ENCOUNTER — HOSPITAL ENCOUNTER (EMERGENCY)
Age: 34
Discharge: HOME | End: 2024-02-22
Attending: STUDENT IN AN ORGANIZED HEALTH CARE EDUCATION/TRAINING PROGRAM

## 2024-02-22 VITALS
SYSTOLIC BLOOD PRESSURE: 137 MMHG | WEIGHT: 190 LBS | RESPIRATION RATE: 16 BRPM | HEART RATE: 95 BPM | DIASTOLIC BLOOD PRESSURE: 90 MMHG | OXYGEN SATURATION: 96 % | HEIGHT: 72 IN | TEMPERATURE: 98.2 F | BODY MASS INDEX: 25.73 KG/M2

## 2024-02-22 DIAGNOSIS — Z77.21 EXPOSURE TO BLOODBORNE PATHOGEN: Primary | ICD-10-CM

## 2024-02-22 LAB
ALBUMIN SERPL-MCNC: 3.8 G/DL (ref 3.6–5.1)
ALBUMIN/GLOB SERPL: 1 {RATIO} (ref 1–2.4)
ALP SERPL-CCNC: 70 UNITS/L (ref 45–117)
ALT SERPL-CCNC: 25 UNITS/L
ANION GAP SERPL CALC-SCNC: 5 MMOL/L (ref 7–19)
ANNOTATION COMMENT IMP: NORMAL
AST SERPL-CCNC: 20 UNITS/L
BILIRUB SERPL-MCNC: 0.4 MG/DL (ref 0.2–1)
BUN SERPL-MCNC: 14 MG/DL (ref 6–20)
BUN/CREAT SERPL: 10 (ref 7–25)
CALCIUM SERPL-MCNC: 9.2 MG/DL (ref 8.4–10.2)
CHLORIDE SERPL-SCNC: 109 MMOL/L (ref 97–110)
CO2 SERPL-SCNC: 29 MMOL/L (ref 21–32)
CREAT SERPL-MCNC: 1.34 MG/DL (ref 0.67–1.17)
EGFRCR SERPLBLD CKD-EPI 2021: 72 ML/MIN/{1.73_M2}
FASTING DURATION TIME PATIENT: ABNORMAL H
GLOBULIN SER-MCNC: 3.8 G/DL (ref 2–4)
GLUCOSE SERPL-MCNC: 94 MG/DL (ref 70–99)
HBV CORE IGG+IGM SER QL: NEGATIVE
HBV SURFACE AB SER QL: POSITIVE
HBV SURFACE AG SER QL: NEGATIVE
HCV AB SER QL: NEGATIVE
HIV 1+2 AB+HIV1P24 AG SERPLBLD IA.RAPID: NONREACTIVE
POTASSIUM SERPL-SCNC: 4 MMOL/L (ref 3.4–5.1)
PROT SERPL-MCNC: 7.6 G/DL (ref 6.4–8.2)
SODIUM SERPL-SCNC: 139 MMOL/L (ref 135–145)

## 2024-02-22 PROCEDURE — 10002803 HB RX 637: Performed by: STUDENT IN AN ORGANIZED HEALTH CARE EDUCATION/TRAINING PROGRAM

## 2024-02-22 PROCEDURE — 80053 COMPREHEN METABOLIC PANEL: CPT | Performed by: STUDENT IN AN ORGANIZED HEALTH CARE EDUCATION/TRAINING PROGRAM

## 2024-02-22 PROCEDURE — 86704 HEP B CORE ANTIBODY TOTAL: CPT | Performed by: STUDENT IN AN ORGANIZED HEALTH CARE EDUCATION/TRAINING PROGRAM

## 2024-02-22 PROCEDURE — 86803 HEPATITIS C AB TEST: CPT | Performed by: STUDENT IN AN ORGANIZED HEALTH CARE EDUCATION/TRAINING PROGRAM

## 2024-02-22 PROCEDURE — 99283 EMERGENCY DEPT VISIT LOW MDM: CPT

## 2024-02-22 PROCEDURE — 87806 HIV AG W/HIV1&2 ANTB W/OPTIC: CPT | Performed by: STUDENT IN AN ORGANIZED HEALTH CARE EDUCATION/TRAINING PROGRAM

## 2024-02-22 PROCEDURE — 36415 COLL VENOUS BLD VENIPUNCTURE: CPT

## 2024-02-22 PROCEDURE — 10002804 HB RX 253: Performed by: STUDENT IN AN ORGANIZED HEALTH CARE EDUCATION/TRAINING PROGRAM

## 2024-02-22 RX ORDER — EMTRICITABINE AND TENOFOVIR DISOPROXIL FUMARATE 200; 300 MG/1; MG/1
1 TABLET, FILM COATED ORAL DAILY
Qty: 30 TABLET | Refills: 0 | Status: SHIPPED | OUTPATIENT
Start: 2024-02-26 | End: 2024-03-27

## 2024-02-22 RX ORDER — EMTRICITABINE AND TENOFOVIR DISOPROXIL FUMARATE 200; 300 MG/1; MG/1
1 TABLET, FILM COATED ORAL ONCE
Status: COMPLETED | OUTPATIENT
Start: 2024-02-22 | End: 2024-02-22

## 2024-02-22 RX ORDER — ONDANSETRON 4 MG/1
4 TABLET, ORALLY DISINTEGRATING ORAL ONCE
Status: COMPLETED | OUTPATIENT
Start: 2024-02-22 | End: 2024-02-22

## 2024-02-22 RX ADMIN — RALTEGRAVIR 400 MG: 400 TABLET, FILM COATED ORAL at 04:22

## 2024-02-22 RX ADMIN — ONDANSETRON 4 MG: 4 TABLET, ORALLY DISINTEGRATING ORAL at 03:38

## 2024-02-22 RX ADMIN — EMTRICITABINE AND TENOFOVIR DISOPROXIL FUMARATE 1 TABLET: 200; 300 TABLET, FILM COATED ORAL at 04:18

## 2024-02-22 RX ADMIN — RALTEGRAVIR 3200 MG: 400 TABLET, FILM COATED ORAL at 04:23

## 2024-02-22 RX ADMIN — EMTRICITABINE AND TENOFOVIR DISOPROXIL FUMARATE 4 TABLET: 200; 300 TABLET, FILM COATED ORAL at 04:23

## 2024-02-22 ASSESSMENT — PAIN SCALES - GENERAL: PAINLEVEL_OUTOF10: 0

## 2024-04-01 ENCOUNTER — TELEPHONE (OUTPATIENT)
Dept: DERMATOLOGY CLINIC | Facility: CLINIC | Age: 34
End: 2024-04-01

## 2024-04-01 NOTE — TELEPHONE ENCOUNTER
LOV 1/8/24 - Ok to proceed with PA?  TB done in January 2024.  [email or web address] message sent to pt asking for an update.

## 2024-04-02 ENCOUNTER — TELEPHONE (OUTPATIENT)
Dept: DERMATOLOGY CLINIC | Facility: CLINIC | Age: 34
End: 2024-04-02

## 2024-04-02 RX ORDER — PREDNISOLONE SODIUM PHOSPHATE 10 MG/5ML
SOLUTION ORAL
Qty: 150 ML | Refills: 0 | Status: SHIPPED | OUTPATIENT
Start: 2024-04-02

## 2024-04-02 NOTE — TELEPHONE ENCOUNTER
There are several options.  Brief burst of oral prednisone tablets, or oral prednisone suspension to swish/apply to tongue or clobetasol ointment although this typically works better for lesions on the gums and cheeks.    I would start with the suspension he may swish around 4 several minutes and then swallow this will be a low-dose of oral steroids as well.  Rx sent to  [facility]    Please confirm no thrush

## 2024-04-02 NOTE — TELEPHONE ENCOUNTER
Spoke with pt and he states he has really painful psoriasis patches on his tongue and is asking if there is anything he can take until the Skyrizi starts to work.  Pt just took his 2nd injection of Skyrizi.  Please advise.  Thank you.

## 2024-04-02 NOTE — TELEPHONE ENCOUNTER
PA form completed and given to KMT for review/signature.  Staff - please fax signed form to [phone].  Thank you.

## 2024-05-09 ENCOUNTER — PATIENT MESSAGE (OUTPATIENT)
Dept: FAMILY MEDICINE CLINIC | Facility: CLINIC | Age: 34
End: 2024-05-09

## 2024-06-10 ENCOUNTER — OFFICE VISIT (OUTPATIENT)
Dept: DERMATOLOGY CLINIC | Facility: CLINIC | Age: 34
End: 2024-06-10
Payer: COMMERCIAL

## 2024-06-10 DIAGNOSIS — L40.50 PSORIATIC ARTHRITIS OF MULTIPLE JOINTS (HCC): ICD-10-CM

## 2024-06-10 DIAGNOSIS — Z51.81 MEDICATION MONITORING ENCOUNTER: ICD-10-CM

## 2024-06-10 DIAGNOSIS — L40.8 OTHER PSORIASIS: ICD-10-CM

## 2024-06-10 DIAGNOSIS — L40.0 PSORIASIS VULGARIS: Primary | ICD-10-CM

## 2024-06-10 PROCEDURE — 99214 OFFICE O/P EST MOD 30 MIN: CPT | Performed by: DERMATOLOGY

## 2024-06-10 RX ORDER — CLOTRIMAZOLE AND BETAMETHASONE DIPROPIONATE 10; .64 MG/G; MG/G
CREAM TOPICAL
Qty: 45 G | Refills: 2 | Status: SHIPPED | OUTPATIENT
Start: 2024-06-10

## 2024-06-10 RX ORDER — FLUOCINOLONE ACETONIDE 0.11 MG/ML
OIL TOPICAL
Qty: 118 ML | Refills: 5 | Status: SHIPPED | OUTPATIENT
Start: 2024-06-10

## 2024-06-11 ENCOUNTER — TELEPHONE (OUTPATIENT)
Dept: DERMATOLOGY CLINIC | Facility: CLINIC | Age: 34
End: 2024-06-11

## 2024-06-11 NOTE — TELEPHONE ENCOUNTER
Fax from [facility]    Placed fax in pa inbox     Fluocinolone Acetonide Scalp (DERMA-SMOOTHE/FS SCALP) 0.01 % External Oil

## 2024-06-12 NOTE — PROGRESS NOTES
Janusz Peace is a 34 year old male.    Chief Complaint   Patient presents with    Psoriasis     LOV -1/24. Pt present for f/u on psoriasis. Pt on SKYRIZI. Pt states his psoriasis has been moderate. Pt states after his first injection he saw a lot of improvement.. However on his 2nd injection he started seeing some flare ups again.              Augmentin [amoxicillin-pot clavulanate] and Seasonal  Current Outpatient Medications   Medication Sig Dispense Refill    clotrimazole-betamethasone 1-0.05 % External Cream Use bid to psoriasis 45 g 2    Fluocinolone Acetonide Scalp (DERMA-SMOOTHE/FS SCALP) 0.01 % External Oil Use qhs as directed for scalp psoriasis 118 mL 5    Risankizumab-rzaa (SKYRIZI PEN) 150 MG/ML Subcutaneous Solution Auto-injector Inject 150 mg into the skin As Directed. Induction dose 150mg at week 0,4 then begin maintenance dose at week 16 2 mL 0    Risankizumab-rzaa (SKYRIZI PEN) 150 MG/ML Subcutaneous Solution Auto-injector Inject 150 mg into the skin every 3 (three) months. 1 mL 1    hydrocortisone 2.5 % External Cream Apply 1 Application topically 2 (two) times daily. Apply every morning and evening. 454 g 1    hydrocortisone 2.5 % External Cream Apply 1 Application topically 2 (two) times daily. Apply tid while at work as needed 90 g 2    clobetasol 0.05 % External Ointment Use bid to psoriasis 60 g 1    prednisoLONE Sodium Phosphate 10 MG/5ML Oral Solution Swish 5 mL for 2 minutes, swallow 3 times daily 3d then twice daily for 3 days, then daily for 3 (Patient not taking: Reported on 6/10/2024) 150 mL 0    Ciclopirox 0.77 % External Gel Apply qam as directed to AA (Patient not taking: Reported on 6/10/2024) 100 g 2    Ciclopirox Olamine 0.77 % External Cream Apply at bedtime to AA (Patient not taking: Reported on 6/10/2024) 90 g 2    hydrocortisone (ANUSOL-HC) 2.5 % External Cream Place 1 Application rectally 2 (two) times daily. (Patient not taking: Reported on 6/10/2024) 28 g 1     clarithromycin 500 MG Oral Tab Take 1 tablet (500 mg total) by mouth 2 (two) times daily. (Patient not taking: Reported on 12/22/2023) 20 tablet 0    fluconazole 100 MG Oral Tab Take 1 tablet (100 mg total) by mouth in the morning. 1 po qd. (Patient not taking: Reported on 1/27/2023) 5 tablet 3    mupirocin 2 % External Ointment Use as directed bid to affected area (Patient not taking: Reported on 12/22/2023) 22 g 3      Past Medical History:    Chronic fatigue syndrome    Psoriasis    Thyroid disease      Social History:  Social History     Socioeconomic History    Marital status:     Number of children: 1   Occupational History    Occupation: [employer]   Tobacco Use    Smoking status: Some Days     Current packs/day: 0.25     Average packs/day: 0.3 packs/day for 9.0 years (2.3 ttl pk-yrs)     Types: Cigarettes    Smokeless tobacco: Never   Vaping Use    Vaping status: Never Used   Substance and Sexual Activity    Alcohol use: Yes     Comment: social    Drug use: No   Other Topics Concern    Grew up on a farm No    History of tanning No    Outdoor occupation No    Pt has a pacemaker No    Pt has a defibrillator No    Reaction to local anesthetic No                 Current Outpatient Medications   Medication Sig Dispense Refill    clotrimazole-betamethasone 1-0.05 % External Cream Use bid to psoriasis 45 g 2    Fluocinolone Acetonide Scalp (DERMA-SMOOTHE/FS SCALP) 0.01 % External Oil Use qhs as directed for scalp psoriasis 118 mL 5    Risankizumab-rzaa (SKYRIZI PEN) 150 MG/ML Subcutaneous Solution Auto-injector Inject 150 mg into the skin As Directed. Induction dose 150mg at week 0,4 then begin maintenance dose at week 16 2 mL 0    Risankizumab-rzaa (SKYRIZI PEN) 150 MG/ML Subcutaneous Solution Auto-injector Inject 150 mg into the skin every 3 (three) months. 1 mL 1    hydrocortisone 2.5 % External Cream Apply 1 Application topically 2 (two) times daily. Apply every morning and evening. 454 g 1     hydrocortisone 2.5 % External Cream Apply 1 Application topically 2 (two) times daily. Apply tid while at work as needed 90 g 2    clobetasol 0.05 % External Ointment Use bid to psoriasis 60 g 1    prednisoLONE Sodium Phosphate 10 MG/5ML Oral Solution Swish 5 mL for 2 minutes, swallow 3 times daily 3d then twice daily for 3 days, then daily for 3 (Patient not taking: Reported on 6/10/2024) 150 mL 0    Ciclopirox 0.77 % External Gel Apply qam as directed to AA (Patient not taking: Reported on 6/10/2024) 100 g 2    Ciclopirox Olamine 0.77 % External Cream Apply at bedtime to AA (Patient not taking: Reported on 6/10/2024) 90 g 2    hydrocortisone (ANUSOL-HC) 2.5 % External Cream Place 1 Application rectally 2 (two) times daily. (Patient not taking: Reported on 6/10/2024) 28 g 1    clarithromycin 500 MG Oral Tab Take 1 tablet (500 mg total) by mouth 2 (two) times daily. (Patient not taking: Reported on 12/22/2023) 20 tablet 0    fluconazole 100 MG Oral Tab Take 1 tablet (100 mg total) by mouth in the morning. 1 po qd. (Patient not taking: Reported on 1/27/2023) 5 tablet 3    mupirocin 2 % External Ointment Use as directed bid to affected area (Patient not taking: Reported on 12/22/2023) 22 g 3     Allergies:   Allergies   Allergen Reactions    Augmentin [Amoxicillin-Pot Clavulanate] NAUSEA AND VOMITING    Seasonal        Past Medical History:    Chronic fatigue syndrome    Psoriasis    Thyroid disease     Past Surgical History:   Procedure Laterality Date    Reconstr mandible,bone plate Right 2008     Social History     Socioeconomic History    Marital status:      Spouse name: Not on file    Number of children: 1    Years of education: Not on file    Highest education level: Not on file   Occupational History    Occupation:    Tobacco Use    Smoking status: Some Days     Current packs/day: 0.25     Average packs/day: 0.3 packs/day for 9.0 years (2.3 ttl pk-yrs)     Types: Cigarettes    Smokeless  tobacco: Never   Vaping Use    Vaping status: Never Used   Substance and Sexual Activity    Alcohol use: Yes     Comment: social    Drug use: No    Sexual activity: Not on file   Other Topics Concern    Grew up on a farm No    History of tanning No    Outdoor occupation No    Pt has a pacemaker No    Pt has a defibrillator No    Reaction to local anesthetic No    Caffeine Concern Not Asked    Exercise Not Asked    Seat Belt Not Asked    Special Diet Not Asked    Stress Concern Not Asked    Weight Concern Not Asked   Social History Narrative    Not on file     Social Determinants of Health     Financial Resource Strain: Not on file   Food Insecurity: Not on file   Transportation Needs: Not on file   Physical Activity: Not on file   Stress: Not on file   Social Connections: Not on file   Housing Stability: Not on file     Family History   Problem Relation Age of Onset    Cancer Mother 40        breast cancer    Hypertension Mother     Asthma Mother     Breast Cancer Mother     Prostate Cancer Paternal Grandfather     Asthma Cousin     Asthma Paternal Uncle                       HPI :      Chief Complaint   Patient presents with    Psoriasis     LOV -1/24. Pt present for f/u on psoriasis. Pt on SKYRIZI. Pt states his psoriasis has been moderate. Pt states after his first injection he saw a lot of improvement.. However on his 2nd injection he started seeing some flare ups again.      Follow-up psoriasis psoriatic arthritis.     Overall has been doing reasonably well.  Had been clear after second injection at week 4 loading dose.  Has slowly had recurrence of lesions starting around weeks 2-3 slowly increasing as he is due for his next injection.  Still active areas along the hairline face, hands minimal, more plaques over the sacrum buttocks area skin folds genitals.  Pustular lesions okay nails improving  Joints have improved minimal morning stiffness especially knees.  Coming back somewhat between injections  Had felt  Enbrel worked well for his arthritis not as well most recently for his skin.  More frequent injections were problematic as far as scheduling with his work schedule and home.  Has changed departments.  Less stress    Last treatments Enbrel, prior to that Skyrizi.  Injections were inconsistent, Humira in the past with no response in skin or joints patient with history of severe pustular psoriasis hands feet especially and inverse psoriasis severe along with his psoriatic arthritis.      Patient presents with concerns above.  Personal history of skin cancer-no  Family history of skin cancer-no    Past notes/ records and appropriate/relevant lab results including pathology and past body maps reviewed. Updated and new information noted in current visit.       ROS:    Denies any other systemic complaints.  No fevers, chills, night sweats, sensitivity to the sun, deeper lumps or bumps.  No other skin complaints.  History, medications, allergies as noted.    Physical examination: Patient  well-developed well-nourished, alert oriented in no acute distress.  Exam of involved, appropriate areas of skin performed, including scalp, head, neck, face,nails, hair, external eyes, including conjunctival mucosa, eyelids, lips, external ears, back, chest, abdomen, arms, legs, palms.  Remarkable for lesions as noted     Scalp along the hairline in particular, thicker plaques, scattered plaques over the scalp, nails improving onycholysis, pits, oil spots on all digits distal nails currently, groin buttocks involvement declines exam    Mild arthritis in digits, knees       ASSESSMENT AND PLAN:     Encounter Diagnoses   Name Primary?    Psoriasis vulgaris Yes    Other psoriasis     Psoriatic arthritis of multiple joints (HCC)     Medication monitoring encounter        Assessment / plan:    Psoriasis psoriatic arthritis.  History of plantar pustular psoriasis.  Plantar psoriasis was debilitating.  Arthritis continues to flare intermittently  moderate to severe    Options discussed with patient.  He would like to r continue Skyrizi been easiest to comply with as long as he does remain on schedule for injections skin and joints are manageable.  Did not have any other side effects.  With consistent injections had been reasonably well-controlled.  Arthritis currently manageable.  Consider additional agents to manage psoriasis such as sulfasalazine if needed    Consider possible additional anti-inflammatory such as low-dose methotrexate or Plaquenil.  If arthritis worsening may need to follow-up with rheumatology.  Monitor carefully.  Consider options such as Tremfya or newer agents.  Therapies requiring him to come into the office will not be reasonable options and more frequent laboratory evaluation may be difficult as well  topicals as noted  Quant gold done negative resulted 1/8/2024 CBC unremarkable lipids HDL 62, CMP unremarkable renal and liver function stable  Handicap parking voucher  Has had this previously    Will see how he does with his next 2 injections.    FMLA related to his psoriatic arthritis forms are in place already    Continue topicals     Psoriasis.  Presently 10-% body surface involvement.  Scalp, plaque, inverse, plantar along with psoriatic arthritis involving multiple joints medically necessary to remain on biologic agent.  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  He is found this more manageable with less frequent dosing.  Medically necessary to resume Skyrizi due to dosing schedule  .  Will treat with medications and grid.  Overall skincare, liberal use of emollients discussed.  Consider more aggressive therapy if worsening.Patient will let us know how they are doing over the next several weeks.  Await clinical response to above therapy.  Recheck in 2-3 months if no improvement.    Pathophysiology discussed with patient.  Therapeutic options reviewed.  See  Medications in grid.   Instructions reviewed at length.     General skin care questions answered.   Reassurance regarding benign skin lesions.Signs and symptoms of skin cancer, ABCDE's of melanoma briefly reviewed.  Sunscreen use, sun protection, encouraged.  Followup as noted in rtc or p.r.n.    Encounter Times new patient  Including precharting, reviewing chart, prior notes obtaining history: 10 minutes, medical exam :10 minutes, notes on body map, plan, counseling 20minutes My total time spent caring for the patient on the day of the encounter: 40 minutes     The patient indicates understanding of these issues and agrees to the plan.  The patient is asked to return as noted in follow-up /as noted above    This note was generated using Dragon voice recognition software.  Please contact me regarding any confusion resulting from errors in recognition.    No orders of the defined types were placed in this encounter.      Meds & Refills for this Visit:   Requested Prescriptions     Signed Prescriptions Disp Refills    clotrimazole-betamethasone 1-0.05 % External Cream 45 g 2     Sig: Use bid to psoriasis    Fluocinolone Acetonide Scalp (DERMA-SMOOTHE/FS SCALP) 0.01 % External Oil 118 mL 5     Sig: Use qhs as directed for scalp psoriasis       Other psoriasis  (primary encounter diagnosis)  Psoriasis vulgaris  Psoriatic arthritis of multiple joints (hcc)  Medication monitoring encounter    No orders of the defined types were placed in this encounter.      Results From Past 48 Hours:  No results found for this or any previous visit (from the past 48 hour(s)).    Meds This Visit:      Imaging Orders:  None     Referral Orders:  No orders of the defined types were placed in this encounter.

## 2024-06-13 NOTE — TELEPHONE ENCOUNTER
Medication PA Requested: Fluocinolone Acetonide Scalp (DERMA-SMOOTHE/FS SCALP) 0.01 % External Oil                                                          CoverMyMeds Used:  Key:  Quantity: 118ml  Day Supply: 30  Sig:  use at bedtime for scalp psoriasis  DX Code: L40.0                                    CPT code (if applicable):   Case Number/Pending Ref#:

## 2024-06-14 NOTE — TELEPHONE ENCOUNTER
Called [employer] at [phone], spoke with Carol Medina ran a test claim, rejecting due to medication not being covered. Medication covered Fluocinolone-Acetonide Scalp, no prior authorization needed.    Message to Dermatology

## 2024-06-20 RX ORDER — FLUCONAZOLE 100 MG/1
100 TABLET ORAL EVERY MORNING
Qty: 5 TABLET | Refills: 3 | OUTPATIENT
Start: 2024-06-20

## 2025-01-03 ENCOUNTER — MED REC SCAN ONLY (OUTPATIENT)
Dept: DERMATOLOGY CLINIC | Facility: CLINIC | Age: 35
End: 2025-01-03

## 2025-01-14 NOTE — TELEPHONE ENCOUNTER
Action Requested: Summary for Provider     []  Critical Lab, Recommendations Needed  [] Need Additional Advice  []   FYI    []   Need Orders  [] Need Medications Sent to Pharmacy  []  Other     SUMMARY: Pt stated that this morning he woke up with his left Quality 431: Preventive Care And Screening: Unhealthy Alcohol Use - Screening: Patient not identified as an unhealthy alcohol user when screened for unhealthy alcohol use using a systematic screening method Quality 226: Preventive Care And Screening: Tobacco Use: Screening And Cessation Intervention: Patient screened for tobacco use and is an ex/non-smoker Detail Level: Detailed

## 2025-01-20 ENCOUNTER — PATIENT MESSAGE (OUTPATIENT)
Dept: DERMATOLOGY CLINIC | Facility: CLINIC | Age: 35
End: 2025-01-20

## 2025-01-20 DIAGNOSIS — L40.8 OTHER PSORIASIS: ICD-10-CM

## 2025-01-20 DIAGNOSIS — Z79.60 ENCOUNTER FOR MONITORING IMMUNOSUPPRESSIVE MEDICATION THERAPY CAUSING IMMUNODEFICIENCY (HCC): ICD-10-CM

## 2025-01-20 DIAGNOSIS — D84.821 ENCOUNTER FOR MONITORING IMMUNOSUPPRESSIVE MEDICATION THERAPY CAUSING IMMUNODEFICIENCY (HCC): ICD-10-CM

## 2025-01-20 DIAGNOSIS — L40.50 PSORIASIS ARTHROPATHICA (HCC): ICD-10-CM

## 2025-01-20 DIAGNOSIS — Z51.81 ENCOUNTER FOR MONITORING IMMUNOSUPPRESSIVE MEDICATION THERAPY CAUSING IMMUNODEFICIENCY (HCC): ICD-10-CM

## 2025-01-20 DIAGNOSIS — L40.0 PSORIASIS VULGARIS: Primary | ICD-10-CM

## 2025-01-20 DIAGNOSIS — L40.50 PSORIATIC ARTHRITIS OF MULTIPLE JOINTS (HCC): ICD-10-CM

## 2025-01-20 RX ORDER — RISANKIZUMAB-RZAA 150 MG/ML
150 INJECTION SUBCUTANEOUS AS DIRECTED
Qty: 2 ML | Refills: 0 | Status: CANCELLED
Start: 2025-01-20

## 2025-01-21 RX ORDER — RISANKIZUMAB-RZAA 150 MG/ML
150 INJECTION SUBCUTANEOUS
Qty: 1 ML | Refills: 1 | Status: CANCELLED | OUTPATIENT
Start: 2025-01-21

## 2025-01-22 NOTE — TELEPHONE ENCOUNTER
He is overdue for quant gold- this needs to be done before we can send new prescription for the Skyrizi--QuantiferonTBGold ordered,

## 2025-01-30 ENCOUNTER — LAB ENCOUNTER (OUTPATIENT)
Dept: LAB | Age: 35
End: 2025-01-30
Attending: DERMATOLOGY
Payer: COMMERCIAL

## 2025-01-30 DIAGNOSIS — L40.8 OTHER PSORIASIS: ICD-10-CM

## 2025-01-30 DIAGNOSIS — L40.0 PSORIASIS VULGARIS: ICD-10-CM

## 2025-01-30 DIAGNOSIS — Z79.60 ENCOUNTER FOR MONITORING IMMUNOSUPPRESSIVE MEDICATION THERAPY CAUSING IMMUNODEFICIENCY (HCC): ICD-10-CM

## 2025-01-30 DIAGNOSIS — Z51.81 ENCOUNTER FOR MONITORING IMMUNOSUPPRESSIVE MEDICATION THERAPY CAUSING IMMUNODEFICIENCY (HCC): ICD-10-CM

## 2025-01-30 DIAGNOSIS — D84.821 ENCOUNTER FOR MONITORING IMMUNOSUPPRESSIVE MEDICATION THERAPY CAUSING IMMUNODEFICIENCY (HCC): ICD-10-CM

## 2025-01-30 DIAGNOSIS — L40.50 PSORIATIC ARTHRITIS OF MULTIPLE JOINTS (HCC): ICD-10-CM

## 2025-01-30 PROCEDURE — 86480 TB TEST CELL IMMUN MEASURE: CPT

## 2025-01-30 PROCEDURE — 36415 COLL VENOUS BLD VENIPUNCTURE: CPT

## 2025-02-01 LAB
M TB IFN-G CD4+ T-CELLS BLD-ACNC: 0.02 IU/ML
M TB TUBERC IFN-G BLD QL: NEGATIVE
M TB TUBERC IGNF/MITOGEN IGNF CONTROL: >10 IU/ML
QFT TB1 AG MINUS NIL: 0 IU/ML
QFT TB2 AG MINUS NIL: 0 IU/ML

## 2025-02-03 RX ORDER — RISANKIZUMAB-RZAA 150 MG/ML
150 INJECTION SUBCUTANEOUS AS DIRECTED
Qty: 2 ML | Refills: 0 | Status: SHIPPED | OUTPATIENT
Start: 2025-02-03

## 2025-02-03 RX ORDER — RISANKIZUMAB-RZAA 150 MG/ML
150 INJECTION SUBCUTANEOUS
Qty: 1 ML | Refills: 1 | Status: SHIPPED | OUTPATIENT
Start: 2025-02-03

## 2025-02-03 NOTE — TELEPHONE ENCOUNTER
New prescription for load and maintenance dose sent to [facility] patient does need follow-up this spring   Subjective     Meenakshi Álvarez is a 82 year old female who presents to clinic today for the following health issues:    HPI   Depression and Anxiety Follow-Up    How are you doing with your depression since your last visit? Improved     How are you doing with your anxiety since your last visit?  No change    Are you having other symptoms that might be associated with depression or anxiety? No    Have you had a significant life event? No     Do you have any concerns with your use of alcohol or other drugs? No    Social History     Tobacco Use     Smoking status: Never Smoker     Smokeless tobacco: Never Used   Substance Use Topics     Alcohol use: No     Alcohol/week: 0.0 standard drinks     Drug use: No     PHQ 12/5/2018 7/3/2019 12/10/2019   PHQ-9 Total Score 0 4 0   Q9: Thoughts of better off dead/self-harm past 2 weeks Not at all Not at all Not at all     JASON-7 SCORE 12/5/2018 7/3/2019 12/10/2019   Total Score - - -   Total Score 0 2 3           Suicide Assessment Five-step Evaluation and Treatment (SAFE-T)      How many servings of fruits and vegetables do you eat daily?  4 or more    On average, how many sweetened beverages do you drink each day (Examples: soda, juice, sweet tea, etc.  Do NOT count diet or artificially sweetened beverages)?   0    How many days per week do you miss taking your medication? 0        Patient Active Problem List   Diagnosis     Hyperlipidemia LDL goal <130     Advanced directives, counseling/discussion     Personal history of malignant neoplasm of breast     Major depressive disorder, single episode, mild (H)     Anxiety     HTN, goal below 150/90     Past Surgical History:   Procedure Laterality Date     BREAST RADIATION TX RT/LT  1988    left     CHOLECYSTECTOMY, OPEN  1986     HYSTERECTOMY, KATRIN  1979    one ovary present     LUMPECTOMY BREAST  1987    left       Social History     Tobacco Use     Smoking status: Never Smoker     Smokeless tobacco: Never Used   Substance Use  "Topics     Alcohol use: No     Alcohol/week: 0.0 standard drinks     Family History   Problem Relation Age of Onset     C.A.D. Brother      Cancer Brother         throat,  age 74         Current Outpatient Medications   Medication Sig Dispense Refill     cetirizine (ZYRTEC) 10 MG tablet Take 10 mg by mouth 2 times daily       DiphenhydrAMINE HCl (BENADRYL PO) Take 25 mg by mouth nightly as needed       FLUoxetine (PROZAC) 40 MG capsule Take 1 capsule (40 mg) by mouth daily 90 capsule 3     hydrochlorothiazide (HYDRODIURIL) 25 MG tablet Take 1 tablet (25 mg) by mouth every morning 90 tablet 3     losartan (COZAAR) 100 MG tablet Take 1 tablet (100 mg) by mouth daily 90 tablet 3     pravastatin (PRAVACHOL) 40 MG tablet TAKE ONE TABLET BY MOUTH EVERY NIGHT AT BEDTIME 90 tablet 3     Allergies   Allergen Reactions     Shellfish Allergy Anaphylaxis     Iodine      Mold Other (See Comments)     Headaches. Itchy throat and eyes.      Penicillins      Sulphadimidine [Sulfa Drugs]      Tetanus Toxoid          Reviewed and updated as needed this visit by Provider  Tobacco  Allergies  Meds  Problems  Med Hx  Surg Hx  Fam Hx         Review of Systems   ROS COMP: CONSTITUTIONAL: NEGATIVE for fever, chills, change in weight  ENT/MOUTH: NEGATIVE for ear, mouth and throat problems  RESP: NEGATIVE for significant cough or SOB  CV: NEGATIVE for chest pain, palpitations or peripheral edema      Objective    BP (!) 152/80   Pulse 78   Temp 97.9  F (36.6  C) (Tympanic)   Ht 1.647 m (5' 4.85\")   Wt 81.2 kg (179 lb)   SpO2 97%   BMI 29.92 kg/m    Body mass index is 29.92 kg/m .  Physical Exam   GENERAL: healthy, alert and no distress  NECK: no adenopathy, no asymmetry, masses, or scars and thyroid normal to palpation  RESP: lungs clear to auscultation - no rales, rhonchi or wheezes  CV: regular rate and rhythm, normal S1 S2, no S3 or S4, no murmur, click or rub, no peripheral edema and peripheral pulses strong  MS: no " "gross musculoskeletal defects noted, no edema  PSYCH: mentation appears normal, affect normal/bright            Assessment & Plan     1. Major depressive disorder, single episode, mild (H)  Well-controlled.  Resume current medications.  Patient does not want to lower the dose of her SSRI at this time.    2. Anxiety  Well-controlled.  Resume current medication.  Patient is not willing to reduce the dose of SSRI at this time.    3. HTN, goal below 150/90  Blood pressure slightly elevated.  Recommending a recheck on blood pressure in the next 2 to 4 weeks.       BMI:   Estimated body mass index is 29.92 kg/m  as calculated from the following:    Height as of this encounter: 1.647 m (5' 4.85\").    Weight as of this encounter: 81.2 kg (179 lb).         Return in about 2 weeks (around 12/24/2019) for Annual Physical Exam.    Trang Warren MD  [facility] [place]      "

## 2025-02-03 NOTE — PROGRESS NOTES
QuantiFERON gold is negative patient may continue with their biologic medication.  Please proceed with authorizations as necessary.  See last message will repeat loading dose for Skyrizi-see MyChart message

## 2025-02-18 ENCOUNTER — OFFICE VISIT (OUTPATIENT)
Dept: FAMILY MEDICINE CLINIC | Facility: CLINIC | Age: 35
End: 2025-02-18
Payer: COMMERCIAL

## 2025-02-18 ENCOUNTER — LAB ENCOUNTER (OUTPATIENT)
Dept: LAB | Age: 35
End: 2025-02-18
Attending: FAMILY MEDICINE
Payer: COMMERCIAL

## 2025-02-18 VITALS
WEIGHT: 178 LBS | RESPIRATION RATE: 20 BRPM | BODY MASS INDEX: 24.11 KG/M2 | HEIGHT: 72 IN | HEART RATE: 87 BPM | SYSTOLIC BLOOD PRESSURE: 100 MMHG | TEMPERATURE: 98 F | OXYGEN SATURATION: 97 % | DIASTOLIC BLOOD PRESSURE: 62 MMHG

## 2025-02-18 DIAGNOSIS — Z00.00 ANNUAL PHYSICAL EXAM: ICD-10-CM

## 2025-02-18 DIAGNOSIS — Z00.00 ANNUAL PHYSICAL EXAM: Primary | ICD-10-CM

## 2025-02-18 LAB
ALBUMIN SERPL-MCNC: 4.8 G/DL (ref 3.2–4.8)
ALBUMIN/GLOB SERPL: 1.6 {RATIO} (ref 1–2)
ALP LIVER SERPL-CCNC: 70 U/L
ALT SERPL-CCNC: 16 U/L
ANION GAP SERPL CALC-SCNC: 11 MMOL/L (ref 0–18)
AST SERPL-CCNC: 20 U/L (ref ?–34)
BASOPHILS # BLD AUTO: 0.01 X10(3) UL (ref 0–0.2)
BASOPHILS NFR BLD AUTO: 0.3 %
BILIRUB SERPL-MCNC: 0.5 MG/DL (ref 0.3–1.2)
BUN BLD-MCNC: 13 MG/DL (ref 9–23)
BUN/CREAT SERPL: 10.1 (ref 10–20)
CALCIUM BLD-MCNC: 9.5 MG/DL (ref 8.7–10.4)
CHLORIDE SERPL-SCNC: 104 MMOL/L (ref 98–112)
CHOLEST SERPL-MCNC: 177 MG/DL (ref ?–200)
CO2 SERPL-SCNC: 26 MMOL/L (ref 21–32)
CREAT BLD-MCNC: 1.29 MG/DL
DEPRECATED RDW RBC AUTO: 38.7 FL (ref 35.1–46.3)
EGFRCR SERPLBLD CKD-EPI 2021: 75 ML/MIN/1.73M2 (ref 60–?)
EOSINOPHIL # BLD AUTO: 0.02 X10(3) UL (ref 0–0.7)
EOSINOPHIL NFR BLD AUTO: 0.5 %
ERYTHROCYTE [DISTWIDTH] IN BLOOD BY AUTOMATED COUNT: 11.4 % (ref 11–15)
EST. AVERAGE GLUCOSE BLD GHB EST-MCNC: 117 MG/DL (ref 68–126)
FASTING PATIENT LIPID ANSWER: YES
FASTING STATUS PATIENT QL REPORTED: YES
GLOBULIN PLAS-MCNC: 3 G/DL (ref 2–3.5)
GLUCOSE BLD-MCNC: 82 MG/DL (ref 70–99)
HBA1C MFR BLD: 5.7 % (ref ?–5.7)
HCT VFR BLD AUTO: 43.4 %
HCV AB SERPL QL IA: NONREACTIVE
HDLC SERPL-MCNC: 45 MG/DL (ref 40–59)
HGB BLD-MCNC: 14.2 G/DL
IMM GRANULOCYTES # BLD AUTO: 0.01 X10(3) UL (ref 0–1)
IMM GRANULOCYTES NFR BLD: 0.3 %
LDLC SERPL CALC-MCNC: 116 MG/DL (ref ?–100)
LYMPHOCYTES # BLD AUTO: 1.62 X10(3) UL (ref 1–4)
LYMPHOCYTES NFR BLD AUTO: 41.9 %
MCH RBC QN AUTO: 30 PG (ref 26–34)
MCHC RBC AUTO-ENTMCNC: 32.7 G/DL (ref 31–37)
MCV RBC AUTO: 91.6 FL
MONOCYTES # BLD AUTO: 0.29 X10(3) UL (ref 0.1–1)
MONOCYTES NFR BLD AUTO: 7.5 %
NEUTROPHILS # BLD AUTO: 1.92 X10 (3) UL (ref 1.5–7.7)
NEUTROPHILS # BLD AUTO: 1.92 X10(3) UL (ref 1.5–7.7)
NEUTROPHILS NFR BLD AUTO: 49.5 %
NONHDLC SERPL-MCNC: 132 MG/DL (ref ?–130)
OSMOLALITY SERPL CALC.SUM OF ELEC: 291 MOSM/KG (ref 275–295)
PLATELET # BLD AUTO: 258 10(3)UL (ref 150–450)
POTASSIUM SERPL-SCNC: 4.6 MMOL/L (ref 3.5–5.1)
PROT SERPL-MCNC: 7.8 G/DL (ref 5.7–8.2)
RBC # BLD AUTO: 4.74 X10(6)UL
SODIUM SERPL-SCNC: 141 MMOL/L (ref 136–145)
TRIGL SERPL-MCNC: 88 MG/DL (ref 30–149)
VLDLC SERPL CALC-MCNC: 15 MG/DL (ref 0–30)
WBC # BLD AUTO: 3.9 X10(3) UL (ref 4–11)

## 2025-02-18 PROCEDURE — 86803 HEPATITIS C AB TEST: CPT

## 2025-02-18 PROCEDURE — 36415 COLL VENOUS BLD VENIPUNCTURE: CPT

## 2025-02-18 PROCEDURE — 3078F DIAST BP <80 MM HG: CPT | Performed by: FAMILY MEDICINE

## 2025-02-18 PROCEDURE — 87389 HIV-1 AG W/HIV-1&-2 AB AG IA: CPT

## 2025-02-18 PROCEDURE — 80053 COMPREHEN METABOLIC PANEL: CPT

## 2025-02-18 PROCEDURE — 99395 PREV VISIT EST AGE 18-39: CPT | Performed by: FAMILY MEDICINE

## 2025-02-18 PROCEDURE — 80061 LIPID PANEL: CPT

## 2025-02-18 PROCEDURE — 3008F BODY MASS INDEX DOCD: CPT | Performed by: FAMILY MEDICINE

## 2025-02-18 PROCEDURE — 3074F SYST BP LT 130 MM HG: CPT | Performed by: FAMILY MEDICINE

## 2025-02-18 PROCEDURE — 85025 COMPLETE CBC W/AUTO DIFF WBC: CPT

## 2025-02-18 PROCEDURE — 83036 HEMOGLOBIN GLYCOSYLATED A1C: CPT

## 2025-02-18 NOTE — PROGRESS NOTES
Subjective:   Patient ID: Janusz Peace is a 34 year old male.    HPI  Doing well   No complaints   Psoriasis controlled   History/Other:   Review of Systems    Constitutional: Negative.  Negative for activity change, appetite change, diaphoresis and fatigue.     Respiratory: Negative.  Negative for apnea, cough, chest tightness and shortness of breath.    Cardiovascular: Negative.  Negative for chest pain, palpitations and leg swelling.   Gastrointestinal: Negative.  Negative for abdominal pain.   Skin:some psoriatic patches on the scalp          Psychiatric/Behavioral: Negative.          Current Outpatient Medications   Medication Sig Dispense Refill    Risankizumab-rzaa (SKYRIZI PEN) 150 MG/ML Subcutaneous Solution Auto-injector Inject 150 mg into the skin every 3 (three) months. 1 mL 1    Fluocinolone Acetonide Scalp (DERMA-SMOOTHE/FS SCALP) 0.01 % External Oil Use qhs as directed for scalp psoriasis 118 mL 5    clotrimazole-betamethasone 1-0.05 % External Cream Use bid to psoriasis 45 g 2    clobetasol 0.05 % External Ointment Use bid to psoriasis 60 g 1     Allergies:Allergies[1]    Objective:   Physical Exam  Constitutional:       Appearance: He is well-developed.   Cardiovascular:      Rate and Rhythm: Normal rate and regular rhythm.      Heart sounds: Normal heart sounds.   Pulmonary:      Effort: Pulmonary effort is normal.      Breath sounds: Normal breath sounds.   Abdominal:      General: Bowel sounds are normal.      Palpations: Abdomen is soft.   Skin:     General: Skin is warm and dry.   Neurological:      Mental Status: He is alert.      Deep Tendon Reflexes: Reflexes are normal and symmetric.         Assessment & Plan:   1. Annual physical exam    Diet and exrecise discussed  2. Psoriasis   Cpm per dermatology    Orders Placed This Encounter   Procedures    Comp Metabolic Panel (14)    Lipid Panel    CBC With Differential With Platelet    Hemoglobin A1C    HIV AG AB Combo [E]    HCV Antibody [E]        Meds This Visit:  Requested Prescriptions      No prescriptions requested or ordered in this encounter       Imaging & Referrals:  None         [1]   Allergies  Allergen Reactions    Augmentin [Amoxicillin-Pot Clavulanate] NAUSEA AND VOMITING    Seasonal

## 2025-03-08 ENCOUNTER — PATIENT MESSAGE (OUTPATIENT)
Dept: DERMATOLOGY CLINIC | Facility: CLINIC | Age: 35
End: 2025-03-08

## 2025-03-10 NOTE — TELEPHONE ENCOUNTER
Dr. Weeks - pt's insurance will not cover a \"re-load.\"  Should pt continue with the maintenance and use topicals for a little while until his next injection if needed?  Thank you.

## 2025-03-11 NOTE — TELEPHONE ENCOUNTER
Yes continue maintenance dose every 12 weeks and topicals.  It may take a little longer to see improvement.  If insurance is not covering the loading dose, we will continue with maintenance presently.  Can switch to different medication like Bimzelex if not improving over the next 4 to 5-months

## 2025-07-09 ENCOUNTER — PATIENT MESSAGE (OUTPATIENT)
Dept: DERMATOLOGY CLINIC | Facility: CLINIC | Age: 35
End: 2025-07-09

## 2025-07-09 NOTE — TELEPHONE ENCOUNTER
Fax from [employer]- please fax updated clinical notes and TB to [employer] at [phone].

## 2025-07-15 ENCOUNTER — PATIENT MESSAGE (OUTPATIENT)
Dept: FAMILY MEDICINE CLINIC | Facility: CLINIC | Age: 35
End: 2025-07-15

## 2025-07-15 DIAGNOSIS — L40.9 PSORIASIS: Primary | ICD-10-CM

## 2025-07-17 NOTE — TELEPHONE ENCOUNTER
Dr. Colon kindly see patient's MyChart message and advise. Pended referral for review and approval if appropriate.

## 2025-08-04 ENCOUNTER — OFFICE VISIT (OUTPATIENT)
Dept: DERMATOLOGY CLINIC | Facility: CLINIC | Age: 35
End: 2025-08-04

## 2025-08-04 DIAGNOSIS — Z51.81 ENCOUNTER FOR MONITORING IMMUNOSUPPRESSIVE MEDICATION THERAPY CAUSING IMMUNODEFICIENCY (HCC): ICD-10-CM

## 2025-08-04 DIAGNOSIS — L40.0 PSORIASIS VULGARIS: Primary | ICD-10-CM

## 2025-08-04 DIAGNOSIS — Z79.60 ENCOUNTER FOR MONITORING IMMUNOSUPPRESSIVE MEDICATION THERAPY CAUSING IMMUNODEFICIENCY (HCC): ICD-10-CM

## 2025-08-04 DIAGNOSIS — L40.50 PSORIASIS ARTHROPATHICA (HCC): ICD-10-CM

## 2025-08-04 DIAGNOSIS — D84.821 ENCOUNTER FOR MONITORING IMMUNOSUPPRESSIVE MEDICATION THERAPY CAUSING IMMUNODEFICIENCY (HCC): ICD-10-CM

## 2025-08-04 PROCEDURE — 99214 OFFICE O/P EST MOD 30 MIN: CPT | Performed by: DERMATOLOGY

## 2025-08-04 RX ORDER — CLOTRIMAZOLE AND BETAMETHASONE DIPROPIONATE 10; .64 MG/G; MG/G
CREAM TOPICAL
Qty: 45 G | Refills: 2 | Status: SHIPPED | OUTPATIENT
Start: 2025-08-04

## 2025-08-04 RX ORDER — ROFLUMILAST 3 MG/G
1 CREAM TOPICAL DAILY
Qty: 60 G | Refills: 1 | Status: SHIPPED | OUTPATIENT
Start: 2025-08-04 | End: 2025-08-05

## 2025-08-04 RX ORDER — RISANKIZUMAB-RZAA 150 MG/ML
150 INJECTION SUBCUTANEOUS
Qty: 1 ML | Refills: 1 | Status: SHIPPED | OUTPATIENT
Start: 2025-08-04

## 2025-08-05 ENCOUNTER — TELEPHONE (OUTPATIENT)
Dept: DERMATOLOGY CLINIC | Facility: CLINIC | Age: 35
End: 2025-08-05

## 2025-08-05 DIAGNOSIS — L40.0 PSORIASIS VULGARIS: Primary | ICD-10-CM

## 2025-08-05 RX ORDER — ROFLUMILAST 3 MG/G
1 CREAM TOPICAL DAILY
Qty: 60 G | Refills: 1 | Status: SHIPPED | OUTPATIENT
Start: 2025-08-05

## 2025-08-14 ENCOUNTER — EKG ENCOUNTER (OUTPATIENT)
Dept: LAB | Age: 35
End: 2025-08-14
Attending: FAMILY MEDICINE

## 2025-08-14 ENCOUNTER — RESULTS FOLLOW-UP (OUTPATIENT)
Dept: FAMILY MEDICINE CLINIC | Facility: CLINIC | Age: 35
End: 2025-08-14

## 2025-08-14 ENCOUNTER — HOSPITAL ENCOUNTER (OUTPATIENT)
Dept: GENERAL RADIOLOGY | Age: 35
Discharge: HOME | End: 2025-08-14
Attending: FAMILY MEDICINE

## 2025-08-14 ENCOUNTER — LAB ENCOUNTER (OUTPATIENT)
Dept: LAB | Age: 35
End: 2025-08-14
Attending: FAMILY MEDICINE

## 2025-08-14 ENCOUNTER — OFFICE VISIT (OUTPATIENT)
Dept: FAMILY MEDICINE CLINIC | Facility: CLINIC | Age: 35
End: 2025-08-14

## 2025-08-14 VITALS
WEIGHT: 175 LBS | DIASTOLIC BLOOD PRESSURE: 75 MMHG | HEART RATE: 69 BPM | HEIGHT: 72 IN | BODY MASS INDEX: 23.7 KG/M2 | SYSTOLIC BLOOD PRESSURE: 117 MMHG

## 2025-08-14 DIAGNOSIS — R63.4 WEIGHT LOSS: ICD-10-CM

## 2025-08-14 DIAGNOSIS — D84.821 ENCOUNTER FOR MONITORING IMMUNOSUPPRESSIVE MEDICATION THERAPY CAUSING IMMUNODEFICIENCY (HCC): ICD-10-CM

## 2025-08-14 DIAGNOSIS — L40.50 PSORIASIS ARTHROPATHICA (HCC): ICD-10-CM

## 2025-08-14 DIAGNOSIS — R94.39 ABNORMAL STRESS TEST: ICD-10-CM

## 2025-08-14 DIAGNOSIS — R73.03 PREDIABETES: Primary | ICD-10-CM

## 2025-08-14 DIAGNOSIS — R06.09 DOE (DYSPNEA ON EXERTION): ICD-10-CM

## 2025-08-14 DIAGNOSIS — L40.0 PSORIASIS VULGARIS: ICD-10-CM

## 2025-08-14 DIAGNOSIS — Z51.81 ENCOUNTER FOR MONITORING IMMUNOSUPPRESSIVE MEDICATION THERAPY CAUSING IMMUNODEFICIENCY (HCC): ICD-10-CM

## 2025-08-14 DIAGNOSIS — Z79.60 ENCOUNTER FOR MONITORING IMMUNOSUPPRESSIVE MEDICATION THERAPY CAUSING IMMUNODEFICIENCY (HCC): ICD-10-CM

## 2025-08-14 DIAGNOSIS — R79.89 ELEVATED SERUM CREATININE: Primary | ICD-10-CM

## 2025-08-14 DIAGNOSIS — R73.03 PREDIABETES: ICD-10-CM

## 2025-08-14 LAB
ALT SERPL-CCNC: 15 U/L (ref 10–49)
ANION GAP SERPL CALC-SCNC: 7 MMOL/L (ref 0–18)
AST SERPL-CCNC: 19 U/L (ref ?–34)
ATRIAL RATE: 56 BPM
BASOPHILS # BLD AUTO: 0.01 X10(3) UL (ref 0–0.2)
BASOPHILS NFR BLD AUTO: 0.2 %
BILIRUB UR QL: NEGATIVE
BUN BLD-MCNC: 10 MG/DL (ref 9–23)
BUN/CREAT SERPL: 7.4 (ref 10–20)
CALCIUM BLD-MCNC: 9.8 MG/DL (ref 8.7–10.4)
CHLORIDE SERPL-SCNC: 105 MMOL/L (ref 98–112)
CHOLEST SERPL-MCNC: 175 MG/DL (ref ?–200)
CLARITY UR: CLEAR
CO2 SERPL-SCNC: 27 MMOL/L (ref 21–32)
CREAT BLD-MCNC: 1.36 MG/DL (ref 0.7–1.3)
DEPRECATED RDW RBC AUTO: 41 FL (ref 35.1–46.3)
EGFRCR SERPLBLD CKD-EPI 2021: 70 ML/MIN/1.73M2 (ref 60–?)
EOSINOPHIL # BLD AUTO: 0.05 X10(3) UL (ref 0–0.7)
EOSINOPHIL NFR BLD AUTO: 1.1 %
ERYTHROCYTE [DISTWIDTH] IN BLOOD BY AUTOMATED COUNT: 11.9 % (ref 11–15)
EST. AVERAGE GLUCOSE BLD GHB EST-MCNC: 117 MG/DL (ref 68–126)
FASTING PATIENT LIPID ANSWER: YES
FASTING STATUS PATIENT QL REPORTED: YES
GLUCOSE BLD-MCNC: 101 MG/DL (ref 70–99)
GLUCOSE UR-MCNC: NORMAL MG/DL
HBA1C MFR BLD: 5.7 % (ref ?–5.7)
HCT VFR BLD AUTO: 43.9 % (ref 39–53)
HDLC SERPL-MCNC: 62 MG/DL (ref 40–59)
HGB BLD-MCNC: 14.4 G/DL (ref 13–17.5)
HGB UR QL STRIP.AUTO: NEGATIVE
IMM GRANULOCYTES # BLD AUTO: 0.01 X10(3) UL (ref 0–1)
IMM GRANULOCYTES NFR BLD: 0.2 %
KETONES UR-MCNC: NEGATIVE MG/DL
LDLC SERPL CALC-MCNC: 102 MG/DL (ref ?–100)
LEUKOCYTE ESTERASE UR QL STRIP.AUTO: NEGATIVE
LYMPHOCYTES # BLD AUTO: 1.89 X10(3) UL (ref 1–4)
LYMPHOCYTES NFR BLD AUTO: 42.1 %
MCH RBC QN AUTO: 31 PG (ref 26–34)
MCHC RBC AUTO-ENTMCNC: 32.8 G/DL (ref 31–37)
MCV RBC AUTO: 94.6 FL (ref 80–100)
MONOCYTES # BLD AUTO: 0.5 X10(3) UL (ref 0.1–1)
MONOCYTES NFR BLD AUTO: 11.1 %
NEUTROPHILS # BLD AUTO: 2.03 X10 (3) UL (ref 1.5–7.7)
NEUTROPHILS # BLD AUTO: 2.03 X10(3) UL (ref 1.5–7.7)
NEUTROPHILS NFR BLD AUTO: 45.3 %
NITRITE UR QL STRIP.AUTO: NEGATIVE
NONHDLC SERPL-MCNC: 113 MG/DL (ref ?–130)
OSMOLALITY SERPL CALC.SUM OF ELEC: 287 MOSM/KG (ref 275–295)
P AXIS: 43 DEGREES
P-R INTERVAL: 184 MS
PH UR: 5.5 (ref 5–8)
PLATELET # BLD AUTO: 184 10(3)UL (ref 150–450)
POTASSIUM SERPL-SCNC: 5.2 MMOL/L (ref 3.5–5.1)
PROT UR-MCNC: NEGATIVE MG/DL
Q-T INTERVAL: 400 MS
QRS DURATION: 84 MS
QTC CALCULATION (BEZET): 386 MS
R AXIS: 53 DEGREES
RBC # BLD AUTO: 4.64 X10(6)UL (ref 4.3–5.7)
SODIUM SERPL-SCNC: 139 MMOL/L (ref 136–145)
SP GR UR STRIP: 1.02 (ref 1–1.03)
T AXIS: 18 DEGREES
TRIGL SERPL-MCNC: 59 MG/DL (ref 30–149)
TSI SER-ACNC: 0.65 UIU/ML (ref 0.55–4.78)
UROBILINOGEN UR STRIP-ACNC: NORMAL
VENTRICULAR RATE: 56 BPM
VLDLC SERPL CALC-MCNC: 10 MG/DL (ref 0–30)
WBC # BLD AUTO: 4.5 X10(3) UL (ref 4–11)

## 2025-08-14 PROCEDURE — 80061 LIPID PANEL: CPT

## 2025-08-14 PROCEDURE — 99214 OFFICE O/P EST MOD 30 MIN: CPT | Performed by: FAMILY MEDICINE

## 2025-08-14 PROCEDURE — 3074F SYST BP LT 130 MM HG: CPT | Performed by: FAMILY MEDICINE

## 2025-08-14 PROCEDURE — 85025 COMPLETE CBC W/AUTO DIFF WBC: CPT

## 2025-08-14 PROCEDURE — 36415 COLL VENOUS BLD VENIPUNCTURE: CPT

## 2025-08-14 PROCEDURE — 86480 TB TEST CELL IMMUN MEASURE: CPT

## 2025-08-14 PROCEDURE — 84443 ASSAY THYROID STIM HORMONE: CPT

## 2025-08-14 PROCEDURE — 84450 TRANSFERASE (AST) (SGOT): CPT

## 2025-08-14 PROCEDURE — 71046 X-RAY EXAM CHEST 2 VIEWS: CPT | Performed by: FAMILY MEDICINE

## 2025-08-14 PROCEDURE — 84460 ALANINE AMINO (ALT) (SGPT): CPT

## 2025-08-14 PROCEDURE — 93010 ELECTROCARDIOGRAM REPORT: CPT | Performed by: INTERNAL MEDICINE

## 2025-08-14 PROCEDURE — 93005 ELECTROCARDIOGRAM TRACING: CPT

## 2025-08-14 PROCEDURE — 83036 HEMOGLOBIN GLYCOSYLATED A1C: CPT

## 2025-08-14 PROCEDURE — 81003 URINALYSIS AUTO W/O SCOPE: CPT | Performed by: FAMILY MEDICINE

## 2025-08-14 PROCEDURE — 3078F DIAST BP <80 MM HG: CPT | Performed by: FAMILY MEDICINE

## 2025-08-14 PROCEDURE — 80048 BASIC METABOLIC PNL TOTAL CA: CPT

## 2025-08-14 PROCEDURE — 3008F BODY MASS INDEX DOCD: CPT | Performed by: FAMILY MEDICINE

## 2025-08-17 ENCOUNTER — HOSPITAL ENCOUNTER (OUTPATIENT)
Dept: ULTRASOUND IMAGING | Age: 35
Discharge: HOME | End: 2025-08-17
Attending: FAMILY MEDICINE

## 2025-08-17 ENCOUNTER — HOSPITAL ENCOUNTER (OUTPATIENT)
Dept: ULTRASOUND IMAGING | Age: 35
End: 2025-08-17
Attending: FAMILY MEDICINE

## 2025-08-17 DIAGNOSIS — R79.89 ELEVATED SERUM CREATININE: ICD-10-CM

## 2025-08-17 PROCEDURE — 76775 US EXAM ABDO BACK WALL LIM: CPT | Performed by: FAMILY MEDICINE

## 2025-08-18 LAB
M TB IFN-G CD4+ T-CELLS BLD-ACNC: 0.06 IU/ML
M TB TUBERC IFN-G BLD QL: NEGATIVE
M TB TUBERC IGNF/MITOGEN IGNF CONTROL: >10 IU/ML
QFT TB1 AG MINUS NIL: 0 IU/ML
QFT TB2 AG MINUS NIL: -0.02 IU/ML

## 2025-08-20 ENCOUNTER — LAB ENCOUNTER (OUTPATIENT)
Dept: LAB | Age: 35
End: 2025-08-20
Attending: FAMILY MEDICINE

## 2025-08-20 ENCOUNTER — MED REC SCAN ONLY (OUTPATIENT)
Dept: DERMATOLOGY CLINIC | Facility: CLINIC | Age: 35
End: 2025-08-20

## 2025-08-20 ENCOUNTER — HOSPITAL ENCOUNTER (OUTPATIENT)
Dept: ULTRASOUND IMAGING | Age: 35
Discharge: HOME | End: 2025-08-20
Attending: FAMILY MEDICINE

## 2025-08-20 DIAGNOSIS — R79.89 ELEVATED SERUM CREATININE: ICD-10-CM

## 2025-08-20 DIAGNOSIS — R63.4 WEIGHT LOSS: ICD-10-CM

## 2025-08-20 LAB
ANION GAP SERPL CALC-SCNC: 8 MMOL/L (ref 0–18)
BUN BLD-MCNC: 14 MG/DL (ref 9–23)
BUN/CREAT SERPL: 10.2 (ref 10–20)
CALCIUM BLD-MCNC: 10 MG/DL (ref 8.7–10.4)
CHLORIDE SERPL-SCNC: 106 MMOL/L (ref 98–112)
CO2 SERPL-SCNC: 28 MMOL/L (ref 21–32)
CREAT BLD-MCNC: 1.37 MG/DL (ref 0.7–1.3)
EGFRCR SERPLBLD CKD-EPI 2021: 69 ML/MIN/1.73M2 (ref 60–?)
FASTING STATUS PATIENT QL REPORTED: YES
GLUCOSE BLD-MCNC: 95 MG/DL (ref 70–99)
OSMOLALITY SERPL CALC.SUM OF ELEC: 294 MOSM/KG (ref 275–295)
POTASSIUM SERPL-SCNC: 4.7 MMOL/L (ref 3.5–5.1)
SODIUM SERPL-SCNC: 142 MMOL/L (ref 136–145)

## 2025-08-20 PROCEDURE — 80048 BASIC METABOLIC PNL TOTAL CA: CPT

## 2025-08-20 PROCEDURE — 76700 US EXAM ABDOM COMPLETE: CPT | Performed by: FAMILY MEDICINE

## 2025-08-20 PROCEDURE — 36415 COLL VENOUS BLD VENIPUNCTURE: CPT

## 2025-08-28 ENCOUNTER — HOSPITAL ENCOUNTER (OUTPATIENT)
Dept: CV DIAGNOSTICS | Facility: HOSPITAL | Age: 35
Discharge: HOME | End: 2025-08-28
Attending: FAMILY MEDICINE

## 2025-08-28 DIAGNOSIS — R06.09 DOE (DYSPNEA ON EXERTION): ICD-10-CM

## 2025-08-28 LAB
% OF MAX PREDICTED HR: 100 %
MAX DIASTOLIC BP: 74 MMHG
MAX HEART RATE: 193 BPM
MAX PREDICTED HEART RATE: 185 BPM
MAX SYSTOLIC BP: 168 MMHG
MAX WORK LOAD: 172

## 2025-08-28 PROCEDURE — 93350 STRESS TTE ONLY: CPT | Performed by: FAMILY MEDICINE

## 2025-08-28 PROCEDURE — 93018 CV STRESS TEST I&R ONLY: CPT | Performed by: INTERNAL MEDICINE

## 2025-08-28 PROCEDURE — 93017 CV STRESS TEST TRACING ONLY: CPT | Performed by: FAMILY MEDICINE

## 2025-08-28 PROCEDURE — 93016 CV STRESS TEST SUPVJ ONLY: CPT | Performed by: INTERNAL MEDICINE

## (undated) DIAGNOSIS — L40.9 PSORIASIS: Primary | ICD-10-CM

## (undated) NOTE — LETTER
[facility]  [address]  [address] [place] [address]       05/01/18        Patient: Garfield Crain   YOB: 1990   Date of Visit: 5/1/2018       Dear  Dr. Bernice Peralta DO,      Thank you for referring Garfield Crain to my

## (undated) NOTE — LETTER
[address]  [address]  [address] [facility] [address]  [phone]          12/17/21    Bhavin Mendez  5/2/1990        This document is to verify Bhavinmichelle Mendez was seen in the DEPARTMENT OF [facility] MEDICAL [place] for evaluation and treatme

## (undated) NOTE — LETTER
[facility] [place] [address]  [address] [facility] [address]  Dept: [phone]  Dept Fax: [phone]  Loc: [phone]      September 2, 2017    Patient: Maura Pratt   Date of Visit: 9/2/2017       To Whom It May Concern:    Ines Coles

## (undated) NOTE — MR AVS SNAPSHOT
[facility]  [address], [address] [place] [address] Manoj  [phone]               Thank you for choosing us for your health care visit with Dolores Casillas DO.   We are glad to serve you and happy to provide you with this sum [employer] questions? Call [phone] for help. [employer] is NOT to be used for urgent needs. For medical emergencies, dial 911.            Visit [facility] online at  [email or web address]

## (undated) NOTE — MR AVS SNAPSHOT
[place]  [address]  [phone]               Thank you for choosing us for your health care visit with Asiya Redman MD.  We are glad to serve you and happy to provide you with this carpenter office. At that time, you will be provided with any authorization numbers or be assured that none are required. You can then schedule your appointment. Failure to obtain required authorization numbers can create reimbursement difficulties for you.         C

## (undated) NOTE — LETTER
2/19/2018              [address] [address]         To whom it may concern,    Darian Carrillo was seen in the office today for evaluation of left knee pain after injury.   He has been referred to orthopedic sp

## (undated) NOTE — LETTER
6/5/2018          To Whom It May Concern:    Mason Alonzo is currently under my medical care. He may return to work in a sedentary position for 4 weeks. If you require additional information please contact our office.         Sincerely,    Tim Rincon.

## (undated) NOTE — Clinical Note
4/7/2017          To Whom It May Concern:    Mason Alonzo is currently under my medical care and may not return to work until after he is evaluated by Dr Buster Camara or Dr Benedict Hoyt . If you require additional information please contact our office.         Since

## (undated) NOTE — LETTER
11/17/2017              [address] [address]         To whom it may concern,    Karlo Justice was seen in the office today. Please excuse him from work 11/14/17 and 11/17/17.       Sincerely,        Ron Morocho

## (undated) NOTE — LETTER
11/17/2017              Rosario Crane M. Veronika         [address] [facility] [address]         To whom it may concern,    Rosario Martin is under my medical care. He has history of psoriasis and chronic leg pain due to psoriatic arthritis.

## (undated) NOTE — LETTER
9/14/2018          To Whom It May Concern:    Hailey Pereira is currently under my medical care and may not return to work at this time. Please excuse Verito Sparks for 2 weeks. If you require additional information please contact our office.         Sincerely,

## (undated) NOTE — Clinical Note
5/13/2017      REGARDING:        Caitlin Foote        [address] [address]     DIAGNOSIS:                                                                               Chronic Fatigue Syndrome        To Whom It May Jacqueline

## (undated) NOTE — MR AVS SNAPSHOT
[facility] SPECIALTY [facility] [place] [address] [id number] [phone]               Thank you for choosing us for your health care visit with Anita Bonilla MD.  We are glad to serve you and happy to provide you with this summary of Assoc Dx:  Psoriasis vulgaris [L40.0], Encounter for medication management [Z79.899], Psoriatic arthritis ([facility] [L40.50]           SHERICE, Direct Screen    Complete by:  Jun 07, 2017 (Approximate)    Assoc Dx:  Psoriasis vulgaris [L40.0], Encounter for [employer]

## (undated) NOTE — LETTER
[facility] [place]  [address]  [address] [facility] [address]  Dept: [phone]  Dept Fax: [phone]  Loc: [phone]      Date & Time: 9/7/2017, 8:16 AM      Dear Alana Lee,    We have received test results pertaining to you

## (undated) NOTE — MR AVS SNAPSHOT
[place]  [address]  [phone]               Thank you for choosing us for your health care visit with Peyton Johnson MD.  We are glad to serve you and happy to provide you with this carpenter Roseanne Trinh [facility] [address]  [address] [address], [place]    It is the patient's responsibility to check with and follow their insurance company's guidelines for prior authorization for this test.  You may be held resp [address] for [address]  [address] [facility] [address]  [address], [address], [address]    [facility]  [address] [place], [address] [place] [address]        2 times a week for 4 weeks

## (undated) NOTE — MR AVS SNAPSHOT
[place]  [address]  [phone]               Thank you for choosing us for your health care visit with Adonis Cabezas MD.  We are glad to serve you and happy to provide you with this carpenter Order:  Physical Therapy External        Comment:  2x a week for 6 weeks          Donny Mina MD   [address] [facility] [address]   Phone:  [phone]   Fax:  [phone]    Diagnoses:  Sprain of metacarpophalangeal (MCP) joint of r Summaries. If you've been to the Emergency Department or your doctor's office, you can view your past visit information in [employer] by going to Visits < Visit Summaries. [employer] questions? Call [phone] for help.   [employer] is NOT to be used for urge

## (undated) NOTE — MR AVS SNAPSHOT
[facility]  [address], [address] [place]  [phone]               Thank you for choosing us for your health care visit with Ren Cage DO.   We are glad to serve you and happy to provide you with this sum MyChart     Visit Feedtrace  You can access your MyChart to more actively manage your health care and view more details from this visit by going to [email or web address].   If you've recently had a stay at the Hospital you can access yo

## (undated) NOTE — Clinical Note
2/8/2017          To Whom It May Concern:    Caitlin Foote is currently under my medical care and may return to work at this time. Activity is restricted as follows: light duty, no climbing, no lifting over 20 lbs.  and no prolonged standing, and no con

## (undated) NOTE — LETTER
9/14/2018          To Whom It May Concern:    Bhavin Mendez is currently under my medical care and may not return to work at this time. Please excuse Whitney Lawler for 4 weeks. If you require additional information please contact our office.         Sincerely,

## (undated) NOTE — MR AVS SNAPSHOT
[facility]  [address], [address] [place] [address]  [phone]               Thank you for choosing us for your health care visit with John Hutchins DO.   We are glad to serve you and happy to provide you with this sum [address]  [address] [place], [place]    [address]  [address], [place]    It is the patient's responsibility to check with and follow their insurance company's guidelines for prior authorization discharge instructions in [employer] by going to Visits < Admission Summaries. If you've been to the Emergency Department or your doctor's office, you can view your past visit information in [employer] by going to Visits < Visit Summaries. [employer] questions?

## (undated) NOTE — LETTER
10/9/2018          To Whom It May Concern:    Olivier Del Real is currently under my medical care and may return to work with no restriction as of tomorrow 10/10/2018 . If you require additional information please contact our office.         Sincerely,